# Patient Record
Sex: FEMALE | Race: WHITE | NOT HISPANIC OR LATINO | Employment: FULL TIME | URBAN - METROPOLITAN AREA
[De-identification: names, ages, dates, MRNs, and addresses within clinical notes are randomized per-mention and may not be internally consistent; named-entity substitution may affect disease eponyms.]

---

## 2017-05-16 ENCOUNTER — TELEPHONE (OUTPATIENT)
Dept: FAMILY MEDICINE | Facility: CLINIC | Age: 52
End: 2017-05-16

## 2017-05-16 NOTE — TELEPHONE ENCOUNTER
Patient due for Physical/Blood Pressure Recheck per Becki Dunlap NP.  Called patient, left message to call back. Frances Chaudhari MA

## 2017-08-04 ENCOUNTER — OFFICE VISIT (OUTPATIENT)
Dept: PODIATRY | Facility: CLINIC | Age: 52
End: 2017-08-04
Payer: COMMERCIAL

## 2017-08-04 VITALS — HEIGHT: 63 IN | SYSTOLIC BLOOD PRESSURE: 155 MMHG | DIASTOLIC BLOOD PRESSURE: 95 MMHG

## 2017-08-04 DIAGNOSIS — L60.0 INGROWING NAIL: Primary | ICD-10-CM

## 2017-08-04 PROCEDURE — 11730 AVULSION NAIL PLATE SIMPLE 1: CPT | Mod: T5 | Performed by: PODIATRIST

## 2017-08-04 PROCEDURE — 99203 OFFICE O/P NEW LOW 30 MIN: CPT | Mod: 25 | Performed by: PODIATRIST

## 2017-08-04 NOTE — NURSING NOTE
"Chief Complaint   Patient presents with     Ingrown Toenail     R great toe       Initial BP (!) 155/95 (BP Location: Right arm, Patient Position: Sitting)  Ht 1.6 m (5' 3\") Estimated body mass index is 40.57 kg/(m^2) as calculated from the following:    Height as of 5/2/16: 1.6 m (5' 3\").    Weight as of 12/27/16: 103.9 kg (229 lb).  Medication Reconciliation: complete  "

## 2017-08-04 NOTE — PATIENT INSTRUCTIONS
We wish you continued good healing. If you have any questions or concerns, please do not hesitate to contact us at [phone]      Please remember to call and schedule a follow up appointment if one was recommended at your earliest convenience.   PODIATRY CLINIC HOURS  TELEPHONE NUMBER    Dr. Jaron Resendiz [facility]    Clinics:  [facility]        Kailey Lewis MA  Medical Assistant  Tuesday 1PM-6PM  [facility]  Wednesday 7AM-2PM  [place]  Thursday 10AM-6PM  [place] Friday 7AM-345PM  [place]  Specialty schedulers:   [phone] to make an appointment with any Specialty Provider.        Urgent Care locations:    [facility] Monday-Friday 5 pm - 9 pm. Saturday-Sunday 9 am -5pm    Monday-Friday 11 am - 9 pm Saturday 9 am - 5 pm     Monday-Sunday 12 noon-8PM [phone]     If you need a medication refill, please contact us you may need lab work and/or a follow up visit prior to your refill (i.e. Antifungal medications).    [employer] (secure e-mail communication and access to your chart) to send a message or to make an appointment.    If MRI needed please call Puma Sharma at [phone]        Weight management plan: Patient was referred to their PCP to discuss a diet and exercise plan.

## 2017-08-04 NOTE — MR AVS SNAPSHOT
After Visit Summary   8/4/2017    Seema Babin    MRN: [id number]           Patient Information     Date Of Birth          1965        Visit Information        Provider Department      8/4/2017 2:15 PM Jaron Resendiz DPM [facility]        Care Instructions    We wish you continued good healing. If you have any questions or concerns, please do not hesitate to contact us at [phone]      Please remember to call and schedule a follow up appointment if one was recommended at your earliest convenience.   PODIATRY CLINIC HOURS  TELEPHONE NUMBER    Dr. Jaron ADAMPBETTY FAC FAS    Clinics:  [facility]        Kailey Lewis MA  Medical Assistant  Tuesday 1PM-6PM  [facility]  Wednesday 7AM-2PM  [place]  Thursday 10AM-6PM  [place] Friday 7AM-345PM  [place]  Specialty schedulers:   [phone] to make an appointment with any Specialty Provider.        Urgent Care locations:    [facility] Monday-Friday 5 pm - 9 pm. Saturday-Sunday 9 am -5pm    Monday-Friday 11 am - 9 pm Saturday 9 am - 5 pm     Monday-Sunday 12 noon-8PM [phone]     If you need a medication refill, please contact us you may need lab work and/or a follow up visit prior to your refill (i.e. Antifungal medications).    [employer] (secure e-mail communication and access to your chart) to send a message or to make an appointment.    If MRI needed please call Puma Sharma at [phone]        Weight management plan: Patient was referred to their PCP to discuss a diet and exercise plan.            Follow-ups after your visit        Your next 10 appointments already scheduled     Aug 04, 2017  2:15 PM CDT   New Visit with Jaron Resendiz DPM   [facility] ([facility])    [address] [place] [address]  "[facility] [id number] [phone]              Who to contact     If you have questions or need follow up information about today's clinic visit or your schedule please contact [facility] directly at [phone].  Normal or non-critical lab and imaging results will be communicated to you by MyChart, letter or phone within 4 business days after the clinic has received the results. If you do not hear from us within 7 days, please contact the clinic through MyChart or phone. If you have a critical or abnormal lab result, we will notify you by phone as soon as possible.  Submit refill requests through [employer] or call your pharmacy and they will forward the refill request to us. Please allow 3 business days for your refill to be completed.          Additional Information About Your Visit        [email or web address] Information     [employer] lets you send messages to your doctor, view your test results, renew your prescriptions, schedule appointments and more. To sign up, go to [email or web address] . Click on \"Log in\" on the left side of the screen, which will take you to the Welcome page. Then click on \"Sign up Now\" on the right side of the page.     You will be asked to enter the access code listed below, as well as some personal information. Please follow the directions to create your username and password.     Your access code is: [email or web address]  Expires: 2017 10:18 AM     Your access code will  in 90 days. If you need help or a new code, please call your [facility] or [phone].        Care EveryWhere ID     This is your Care EveryWhere ID. This could be used by other organizations to access your [place] medical records  [id number]        Your Vitals Were     Height                   1.6 m (5' 3\")            Blood Pressure from Last 3 Encounters:   17 (!) 155/95   16 148/86   16 146/90    Weight from Last 3 Encounters:   16 103.9 kg (229 lb)   16 106.1 " kg (234 lb)   04/01/16 106.6 kg (235 lb)              Today, you had the following     No orders found for display       Primary Care Provider Office Phone # Fax #    ANA Camara JESSE [phone]       [place] [id number] [facility] [address]        Equal Access to Services     [facility] : Hadii aad ku hadasho Soomaali, waaxda luqadaha, qaybta kaalmada adeegyada, waxay idiin hayaan adeeg kharash la'aan ah. So [facility] [phone].    ATENCIÓN: Si habla español, tiene a becerril disposición servicios gratuitos de asistencia lingüística. Jessicashaun al [phone].    We comply with applicable federal civil rights laws and [place] laws. We do not discriminate on the basis of race, color, national origin, age, disability sex, sexual orientation or gender identity.            Thank you!     Thank you for choosing [facility]  for your care. Our goal is always to provide you with excellent care. Hearing back from our patients is one way we can continue to improve our services. Please take a few minutes to complete the written survey that you may receive in the mail after your visit with us. Thank you!             Your Updated Medication List - Protect others around you: Learn how to safely use, store and throw away your medicines at www.disposemymeds.org.          This list is accurate as of: 8/4/17  2:13 PM.  Always use your most recent med list.                   Brand Name Dispense Instructions for use Diagnosis    cetirizine 10 MG tablet    zyrTEC    30 tablet    Take 1 tablet (10 mg) by mouth daily

## 2017-08-04 NOTE — PROGRESS NOTES
"Subjective:    Pt is seen today as a new pt referral w/ the c/c of a painful ingrown right great nail medial border.  This has been problematic for 3 month(s).  positivehistory of drainage from the site. This is slowly getting worse.  Aggravated by activity and relieved by rest.  Has tried soaking which has not helped.   denies history of trauma to the area.    ROS:  Denies fever and chill, denies numbness and tingling, denies erythema on dorsum of foot.    Past Medical History:   Diagnosis Date     Hypertension goal BP (blood pressure) < 140/90        Past Surgical History:   Procedure Laterality Date      SECTION       HC TOOTH EXTRACTION W/FORCEP       TUBAL LIGATION         Family History   Problem Relation Age of Onset     DIABETES Mother      DIABETES Sister      DIABETES Brother      DIABETES Maternal Grandmother      Breast Cancer No family hx of      Colon Cancer No family hx of      Prostate Cancer No family hx of        Social History   Substance Use Topics     Smoking status: Never Smoker     Smokeless tobacco: Never Used     Alcohol use No         Current Outpatient Prescriptions:      cetirizine (ZYRTEC) 10 MG tablet, Take 1 tablet (10 mg) by mouth daily, Disp: 30 tablet, Rfl:        Allergies   Allergen Reactions     Sulfa Drugs      Vicodin [Hydrocodone-Acetaminophen]        BP (!) 155/95 (BP Location: Right arm, Patient Position: Sitting)  Ht 1.6 m (5' 3\").  A&O X 3.  Good historian.  Pulses DP, PT 2/4 b/l.  CRT < 3 seconds X 10 digits.  No edema or varicosities noted.  Sensation to light touch intact b/l.  Reflexes 2/4 b/l.  Skin has normal texture and turgor b/l.  Normal arch with weightbearing.  No forefoot or rear foot deformities noted.  MS 5/5 all compartments.  Normal ROM all fore foot and rearfoot joints.  No equinus.  right great toe nail medial border shows soft tissue impingement with localized erythema.   negative active drainage/purulence at this time.  No sinus " tracts.  No nailbed masses or exostosis.  No pain with range of motion of IPJ or MTPJ.  No ascending cellulitis.    ASSESSMENT:    Onychocryptosis with paronychia right toe.    Discussed etiology and treatment options in detail w/ the pt.  The potential causes and nature of an ingrown toenail were discussed with the patient.  We reviewed the natural history/prognosis of the condition and potential risks if no treatment is provided.      Treatment options discussed included conservative management (oral antibiotics, soaking of foot, adequate width shoes)  as well as surgical management (partial or total nail removal).  The pros and cons of both forms of treatment were reviewed.  Handout given to patient.      After thorough discussion and answering all questions, the patient elected to have nail avulsion.  Obtained consent, used 3cc of 1% lidocaine plain to block right first toe.  Sterile prep, then avulsed the affected border(s).  No evidence of deep abscess noted.  Pt tolerated procedure well.  Sterile bandage placed, gave wound care instruction.  Return to clinic in 5 months for permanent .    Jaron Resendiz DPM, FACFAS

## 2017-08-04 NOTE — LETTER
"    2017         RE: Seema Babin  [address] [facility] [address] [address]        Dear Colleague,    Thank you for referring your patient, Seema Babin, to the [facility]. Please see a copy of my visit note below.    Subjective:    Pt is seen today as a new pt referral w/ the c/c of a painful ingrown right great nail medial border.  This has been problematic for 3 month(s).  positivehistory of drainage from the site. This is slowly getting worse.  Aggravated by activity and relieved by rest.  Has tried soaking which has not helped.   denies history of trauma to the area.    ROS:  Denies fever and chill, denies numbness and tingling, denies erythema on dorsum of foot.    Past Medical History:   Diagnosis Date     Hypertension goal BP (blood pressure) < 140/90        Past Surgical History:   Procedure Laterality Date      SECTION       HC TOOTH EXTRACTION W/FORCEP       TUBAL LIGATION         Family History   Problem Relation Age of Onset     DIABETES Mother      DIABETES Sister      DIABETES Brother      DIABETES Maternal Grandmother      Breast Cancer No family hx of      Colon Cancer No family hx of      Prostate Cancer No family hx of        Social History   Substance Use Topics     Smoking status: Never Smoker     Smokeless tobacco: Never Used     Alcohol use No         Current Outpatient Prescriptions:      cetirizine (ZYRTEC) 10 MG tablet, Take 1 tablet (10 mg) by mouth daily, Disp: 30 tablet, Rfl:        Allergies   Allergen Reactions     Sulfa Drugs      Vicodin [Hydrocodone-Acetaminophen]        BP (!) 155/95 (BP Location: Right arm, Patient Position: Sitting)  Ht 1.6 m (5' 3\").  A&O X 3.  Good historian.  Pulses DP, PT 2/4 b/l.  CRT < 3 seconds X 10 digits.  No edema or varicosities noted.  Sensation to light touch intact b/l.  Reflexes 2/4 b/l.  Skin has normal texture and turgor b/l.  Normal arch with weightbearing.  No forefoot or rear foot " deformities noted.  MS 5/5 all compartments.  Normal ROM all fore foot and rearfoot joints.  No equinus.  right great toe nail medial border shows soft tissue impingement with localized erythema.   negative active drainage/purulence at this time.  No sinus tracts.  No nailbed masses or exostosis.  No pain with range of motion of IPJ or MTPJ.  No ascending cellulitis.    ASSESSMENT:    Onychocryptosis with paronychia right toe.    Discussed etiology and treatment options in detail w/ the pt.  The potential causes and nature of an ingrown toenail were discussed with the patient.  We reviewed the natural history/prognosis of the condition and potential risks if no treatment is provided.      Treatment options discussed included conservative management (oral antibiotics, soaking of foot, adequate width shoes)  as well as surgical management (partial or total nail removal).  The pros and cons of both forms of treatment were reviewed.  Handout given to patient.      After thorough discussion and answering all questions, the patient elected to have nail avulsion.  Obtained consent, used 3cc of 1% lidocaine plain to block right first toe.  Sterile prep, then avulsed the affected border(s).  No evidence of deep abscess noted.  Pt tolerated procedure well.  Sterile bandage placed, gave wound care instruction.  Return to clinic in 5 months for permanent .    Jaron Resendiz DPM, FACFAS      Again, thank you for allowing me to participate in the care of your patient.        Sincerely,        Jaron Resendiz DPM

## 2018-10-30 ENCOUNTER — OFFICE VISIT (OUTPATIENT)
Dept: FAMILY MEDICINE | Facility: CLINIC | Age: 53
End: 2018-10-30
Payer: COMMERCIAL

## 2018-10-30 ENCOUNTER — RADIANT APPOINTMENT (OUTPATIENT)
Dept: GENERAL RADIOLOGY | Facility: CLINIC | Age: 53
End: 2018-10-30
Attending: PHYSICIAN ASSISTANT
Payer: COMMERCIAL

## 2018-10-30 VITALS
DIASTOLIC BLOOD PRESSURE: 91 MMHG | HEIGHT: 63 IN | OXYGEN SATURATION: 94 % | TEMPERATURE: 98 F | SYSTOLIC BLOOD PRESSURE: 137 MMHG | WEIGHT: 258.8 LBS | BODY MASS INDEX: 45.86 KG/M2 | HEART RATE: 81 BPM

## 2018-10-30 DIAGNOSIS — M25.562 ACUTE PAIN OF LEFT KNEE: ICD-10-CM

## 2018-10-30 DIAGNOSIS — E66.01 MORBID OBESITY (H): ICD-10-CM

## 2018-10-30 DIAGNOSIS — I10 HYPERTENSION GOAL BP (BLOOD PRESSURE) < 140/90: ICD-10-CM

## 2018-10-30 DIAGNOSIS — M17.11 PRIMARY OSTEOARTHRITIS OF RIGHT KNEE: Primary | ICD-10-CM

## 2018-10-30 DIAGNOSIS — M17.11 PRIMARY OSTEOARTHRITIS OF RIGHT KNEE: ICD-10-CM

## 2018-10-30 PROCEDURE — 73560 X-RAY EXAM OF KNEE 1 OR 2: CPT | Mod: LT

## 2018-10-30 PROCEDURE — 99214 OFFICE O/P EST MOD 30 MIN: CPT | Performed by: PHYSICIAN ASSISTANT

## 2018-10-30 RX ORDER — NAPROXEN 500 MG/1
500 TABLET ORAL 2 TIMES DAILY PRN
Qty: 60 TABLET | Refills: 0 | Status: SHIPPED | OUTPATIENT
Start: 2018-10-30 | End: 2018-11-25

## 2018-10-30 NOTE — PROGRESS NOTES
SUBJECTIVE:   Seema Babin is a 53 year old female who presents to clinic today for the following health issues:      Joint Pain    Onset: ongoing for about a year now for right knee and left knee started recently    Description:   Location: both but left is worse  Character: feels as if you have to crack knuckles but unable to    Intensity: severe    Progression of Symptoms: same but getting worse    Accompanying Signs & Symptoms:  Other symptoms: numbness, tingling when bending down or bending legs and weakness    History:   Previous similar pain: no       Precipitating factors:   Trauma or overuse: no     Alleviating factors:  Improved by: nothing    Therapies Tried and outcome: Ibuprofen but does not help- 800mg not helping. No ice or heat.     Right knee pain. This has been ongoing long term. Has tried ibuprofen but not helpful.   Left knee started about a week ago. So bad she has trouble walking on it. It feels like the knee has to pop. Hard to bend because of a numb feeling. No activities or known injuries. No bruising. No old injuries.   Has a desk job.     Patient is new patient to me. Has not been seen in clinic in several years.     Problem list and histories reviewed & adjusted, as indicated.  Additional history: as documented    Patient Active Problem List   Diagnosis     Body mass index (BMI) of 40.0-44.9 in adult (H)     Hypertension goal BP (blood pressure) < 140/90     Past Surgical History:   Procedure Laterality Date      SECTION        TOOTH EXTRACTION W/FORCEP       TUBAL LIGATION         Social History   Substance Use Topics     Smoking status: Never Smoker     Smokeless tobacco: Never Used     Alcohol use Yes      Comment: wine now and then     Family History   Problem Relation Age of Onset     Diabetes Mother      Diabetes Sister      Diabetes Brother      Diabetes Maternal Grandmother      Breast Cancer No family hx of      Colon Cancer No family hx of       "Prostate Cancer No family hx of            Reviewed and updated as needed this visit by clinical staff  Tobacco  Allergies  Meds  Med Hx  Surg Hx  Fam Hx  Soc Hx      Reviewed and updated as needed this visit by Provider         ROS:  Constitutional, HEENT, cardiovascular, pulmonary, gi and gu systems are negative, except as otherwise noted.    OBJECTIVE:     BP (!) 137/91 (BP Location: Other (Comment), Patient Position: Chair, Cuff Size: Adult Regular)  Pulse 81  Temp 98  F (36.7  C) (Oral)  Ht 5' 2.6\" (1.59 m)  Wt 258 lb 12.8 oz (117.4 kg)  SpO2 94%  BMI 46.44 kg/m2  Body mass index is 46.44 kg/(m^2).  GENERAL: obese, alert and no distress  MS: no gross musculoskeletal defects noted, - No pain with palpation of either knee. No internal derangement of the right knee. Pain and clicking noted with ursula's on the left knee. Negative valgus/varus on the left and negative lachman's.   No bruising or swelling seen at the knee. Patient with 1+ tibial edema bilaterally.   SKIN: no suspicious lesions or rashes  NEURO: Normal strength and tone, mentation intact and speech normal    Diagnostic Test Results:  none     ASSESSMENT/PLAN:       ICD-10-CM    1. Primary osteoarthritis of right knee M17.11 XR Knee Bilateral 1/2 Views     naproxen (NAPROSYN) 500 MG tablet     [facility]   2. Acute pain of left knee M25.562 XR Knee Bilateral 1/2 Views     naproxen (NAPROSYN) 500 MG tablet     [facility]   3. Morbid obesity (H) E66.01    4. Hypertension goal BP (blood pressure) < 140/90 I10    Patient should use tylenol and naproxen as needed for pain. Start physical therapy. Await x-ray results. Encouraged patient to lose weight which will help knee pain.   Advised patient she needs a follow up to discuss her chronic health conditions and elevated blood pressure. This might also be causing the tibial edema.     FUTURE APPOINTMENTS:       - Follow-up visit in 2 " weeks physical.     MELIVNA SingerC  [facility]

## 2018-10-30 NOTE — MR AVS SNAPSHOT
After Visit Summary   10/30/2018    Seema Babin    MRN: [id number]           Patient Information     Date Of Birth          1965        Visit Information        Provider Department      10/30/2018 9:20 AM Jasmyn Dueñas PA-C [facility]        Today's Diagnoses     Primary osteoarthritis of right knee    -  1    Acute pain of left knee          Care Instructions    PLEASE SCHEDULE YOUR PHYSICAL EXAM          Follow-ups after your visit        Additional Services     [facility], AND CHIR[facility]RACTIC REFERRAL       Physical Therapy, Hand Therapy and Chiropractic Care are available through:  *[facility]  *Hand Therapy (Occupational Therapy or Physical Therapy)  *[facility]    Call one number to schedule at any of the above locations: [phone].    Physical therapy, Hand therapy and/or Chiropractic care has been recommended by your physician as an excellent treatment option to reduce pain and help people return to normal activities, including sports.  Therapy and/or chiropractic care services are a great complement or alternative to expensive and invasive surgery, injections, or long-term use of prescription medications. The primary goal is to identify the underlying problem and provide you the tools to manage your condition on your own.     Please be aware that coverage of these services is subject to the terms and limitations of your health insurance plan.  Call member services at your health plan with any benefit or coverage questions.      Please bring the following to your appointment:  *Your personal calendar for scheduling future appointments  *Comfortable clothing                  Follow-up notes from your care team     Return in about 2 weeks (around 11/13/2018) for Physical Exam.      Future tests that were ordered for you today     Open Future Orders        Priority Expected Expires Ordered    XR Knee Bilateral  "1/2 Views Routine 10/30/2018 10/30/2019 10/30/2018    [facility], AND CH[facility]RACTIC REFERRAL Routine  10/30/2019 10/30/2018            Who to contact     If you have questions or need follow up information about today's clinic visit or your schedule please contact [facility] directly at [phone].  Normal or non-critical lab and imaging results will be communicated to you by [employer], letter or phone within 4 business days after the clinic has received the results. If you do not hear from us within 7 days, please contact the clinic through [employer] or phone. If you have a critical or abnormal lab result, we will notify you by phone as soon as possible.  Submit refill requests through [employer] or call your pharmacy and they will forward the refill request to us. Please allow 3 business days for your refill to be completed.          Additional Information About Your Visit        [employer]oh[employer]eran Information     [employer] lets you send messages to your doctor, view your test results, renew your prescriptions, schedule appointments and more. To sign up, go to [email or web address] . Click on \"Log in\" on the left side of the screen, which will take you to the Welcome page. Then click on \"Sign up Now\" on the right side of the page.     You will be asked to enter the access code listed below, as well as some personal information. Please follow the directions to create your username and password.     Your access code is: [email or web address]  Expires: 2019  9:50 AM     Your access code will  in 90 days. If you need help or a new code, please call your [facility] clinic or [phone].        Care EveryWhere ID     This is your Care EveryWhere ID. This could be used by other organizations to access your [facility] medical records  [id number]        Your Vitals Were     Pulse Temperature Height Pulse Oximetry BMI (Body Mass Index)       81 98  F (36.7  C) (Oral) 5' 2.6\" (1.59 m) 94% 46.44 kg/m2        Blood " Pressure from Last 3 Encounters:   10/30/18 (!) 137/91   08/04/17 (!) 155/95   12/27/16 148/86    Weight from Last 3 Encounters:   10/30/18 258 lb 12.8 oz (117.4 kg)   12/27/16 229 lb (103.9 kg)   05/02/16 234 lb (106.1 kg)                 Today's Medication Changes          These changes are accurate as of 10/30/18  9:50 AM.  If you have any questions, ask your nurse or doctor.               Start taking these medicines.        Dose/Directions    naproxen 500 MG tablet   Commonly known as:  NAPROSYN   Used for:  Primary osteoarthritis of right knee, Acute pain of left knee   Started by:  Jasmyn Dueñas PA-C        Dose:  500 mg   Take 1 tablet (500 mg) by mouth 2 times daily as needed for moderate pain   Quantity:  60 tablet   Refills:  0            Where to get your medicines      These medications were sent to [place] [id number] [place] [facility] - [place], [place] - [address]  [address] [place] [address]     Phone:  [phone]     naproxen 500 MG tablet                Primary Care Provider Office Phone # Fax #    Becki Gloria Dunlap, APRN [phone]       [address]  [facility] [address]        Equal Access to Services     [facility] Center[facility]: Hadii aad ku hadasho Soomaali, waaxda luqadaha, qaybta kaalmada adeegyada, waxay idiin hayaan adesallie khemanuel bnuch . So [facility] [phone].    ATENCIÓN: Si habla español, tiene a becerril disposición servicios gratuitos de asistencia lingüística. Llame al [phone].    We comply with applicable federal civil rights laws and [place] laws. We do not discriminate on the basis of race, color, national origin, age, disability, sex, sexual orientation, or gender identity.            Thank you!     Thank you for choosing [facility]  for your care. Our goal is always to provide you with excellent care. Hearing back from our patients is one way we can continue to improve our services. Please take a few minutes to complete the written survey  that you may receive in the mail after your visit with us. Thank you!             Your Updated Medication List - Protect others around you: Learn how to safely use, store and throw away your medicines at www.disposemymeds.org.          This list is accurate as of 10/30/18  9:50 AM.  Always use your most recent med list.                   Brand Name Dispense Instructions for use Diagnosis    cetirizine 10 MG tablet    zyrTEC    30 tablet    Take 1 tablet (10 mg) by mouth daily        IBUPROFEN PO      Take by mouth as needed for moderate pain        naproxen 500 MG tablet    NAPROSYN    60 tablet    Take 1 tablet (500 mg) by mouth 2 times daily as needed for moderate pain    Primary osteoarthritis of right knee, Acute pain of left knee

## 2018-10-30 NOTE — LETTER
[facility]   [address] [place]  [address]  [phone]                                   October 30, 2018    Seema Babin  [address] [facility] [facility] [place] [facility] [place] [address]        Dear Seema,    As suspected you have arthritis of the right knee. The left knee did not show an acute/new problems. Please follow up with physical therapy as planned.     Results for orders placed or performed in visit on 10/30/18   XR Knee Bilateral 1/2 Views    Narrative    XR KNEE BILATERAL 1/2 VW 10/30/2018 10:07 AM    COMPARISON: None.    HISTORY: Bilateral knee osteoarthritis.    FINDINGS:  RIGHT knee: Tricompartment osteophytosis with mild medial  compartmental joint space loss. No fractures are seen. Small amount of  fluid in the joint.    LEFT knee: No fractures are seen. Joint spaces are preserved. No knee  effusion.      Impression    IMPRESSION: Mild medial compartment predominant RIGHT knee  osteoarthritis. No acute bone abnormality identified in either knee.    ROSY ABDI MD       If you have any questions please call the clinic at [phone]    Sincerely,    Jasmyn Dueñas PA-C  hnr

## 2018-10-30 NOTE — PROGRESS NOTES
As suspected you have arthritis of the right knee. The left knee did not show an acute/new problems. Please follow up with physical therapy as planned.   Jasmyn Dueñas PA-C

## 2018-11-25 ENCOUNTER — TELEPHONE (OUTPATIENT)
Dept: FAMILY MEDICINE | Facility: CLINIC | Age: 53
End: 2018-11-25

## 2018-11-25 DIAGNOSIS — M17.11 PRIMARY OSTEOARTHRITIS OF RIGHT KNEE: ICD-10-CM

## 2018-11-25 DIAGNOSIS — M25.562 ACUTE PAIN OF LEFT KNEE: ICD-10-CM

## 2018-11-26 RX ORDER — NAPROXEN 500 MG/1
TABLET ORAL
Qty: 60 TABLET | Refills: 0 | Status: SHIPPED | OUTPATIENT
Start: 2018-11-26 | End: 2018-11-30

## 2018-11-26 NOTE — TELEPHONE ENCOUNTER
Routing refill request to provider for review/approval because:  Failed labs and BP.Susan Gilbert, [facility] Triage.

## 2018-11-26 NOTE — TELEPHONE ENCOUNTER
"Requested Prescriptions   Pending Prescriptions Disp Refills     naproxen (NAPROSYN) 500 MG tablet [Pharmacy Med Name: NAPROXEN 500 MG TABLET] 60 tablet 0    Last Written Prescription Date:  10/30/18  Last Fill Quantity: 60,  # refills: 0   Last office visit: 10/30/2018 with prescribing provider:     Future Office Visit:     Sig: TAKE 1 TABLET (500 MG) BY MOUTH 2 TIMES DAILY AS NEEDED FOR MODERATE PAIN    NSAID Medications Failed    11/25/2018 12:03 PM       Failed - Blood pressure under 140/90 in past 12 months    BP Readings from Last 3 Encounters:   10/30/18 (!) 137/91   08/04/17 (!) 155/95   12/27/16 148/86                Failed - Normal ALT on file in past 12 months    No lab results found.         Failed - Normal AST on file in past 12 months    No lab results found.         Failed - Normal CBC on file in past 12 months    No lab results found.             Failed - Normal serum creatinine on file in past 12 months    No lab results found.         Passed - Recent (12 mo) or future (30 days) visit within the authorizing provider's specialty    Patient had office visit in the last 12 months or has a visit in the next 30 days with authorizing provider or within the authorizing provider's specialty.  See \"Patient Info\" tab in inbasket, or \"Choose Columns\" in Meds & Orders section of the refill encounter.             Passed - Patient is age 6-64 years       Passed - No active pregnancy on record       Passed - No positive pregnancy test in past 12 months          "

## 2018-11-26 NOTE — TELEPHONE ENCOUNTER
1 refill. Patient needs to schedule a physical. She should also schedule with physical therapy if she is continuing to need the medications.   Jasmyn Dueñas PA-C

## 2018-11-28 NOTE — PATIENT INSTRUCTIONS
Look at [employer]i's shoes    Look at MyFitnessPal and [employer]  Start with a goal of losing 1 lb/week    Preventive Health Recommendations  Female Ages 50 - 64    Yearly exam: See your health care provider every year in order to  o Review health changes.   o Discuss preventive care.    o Review your medicines if your doctor has prescribed any.      Get a Pap test every three years (unless you have an abnormal result and your provider advises testing more often).    If you get Pap tests with HPV test, you only need to test every 5 years, unless you have an abnormal result.     You do not need a Pap test if your uterus was removed (hysterectomy) and you have not had cancer.    You should be tested each year for STDs (sexually transmitted diseases) if you're at risk.     Have a mammogram every 1 to 2 years.    Have a colonoscopy at age 50, or have a yearly FIT test (stool test). These exams screen for colon cancer.      Have a cholesterol test every 5 years, or more often if advised.    Have a diabetes test (fasting glucose) every three years. If you are at risk for diabetes, you should have this test more often.     If you are at risk for osteoporosis (brittle bone disease), think about having a bone density scan (DEXA).    Shots: Get a flu shot each year. Get a tetanus shot every 10 years.    Nutrition:     Eat at least 5 servings of fruits and vegetables each day.    Eat whole-grain bread, whole-wheat pasta and brown rice instead of white grains and rice.    Get adequate Calcium and Vitamin D.     Lifestyle    Exercise at least 150 minutes a week (30 minutes a day, 5 days a week). This will help you control your weight and prevent disease.    Limit alcohol to one drink per day.    No smoking.     Wear sunscreen to prevent skin cancer.     See your dentist every six months for an exam and cleaning.    See your eye doctor every 1 to 2 years.  [facility]    If you have any questions regarding to your  visit please contact your care team:       Team Red:   Clinic Hours Telephone Number   Dr. Manda Dunlap, NP 7am-7pm  Monday - Thursday   7am-5pm  Fridays  [phone]  (Appointment scheduling available 24/7)   Urgent Care - [place] - 11am-9pm Monday-Friday Saturday-Sunday- 9am-5pm   [place] - 5pm-9pm Monday-Friday Saturday-Sunday- 9am-5pm  [phone] - [place]  [phone] - [place]       What options do I have for a visit other than an office visit? We offer electronic visits (e-visits) and telephone visits, when medically appropriate.  Please check with your medical insurance to see if these types of visits are covered, as you will be responsible for any charges that are not paid by your insurance.      You can use Canonical (secure electronic communication) to access to your chart, send your primary care provider a message, or make an appointment. Ask a team member how to get started.     For a price quote for your services, please call our Consumer Price Line at [phone] or our Imaging Cost estimation line at [phone] (for imaging tests).

## 2018-11-30 ENCOUNTER — OFFICE VISIT (OUTPATIENT)
Dept: FAMILY MEDICINE | Facility: CLINIC | Age: 53
End: 2018-11-30
Payer: COMMERCIAL

## 2018-11-30 VITALS
HEART RATE: 84 BPM | WEIGHT: 267 LBS | OXYGEN SATURATION: 95 % | DIASTOLIC BLOOD PRESSURE: 92 MMHG | HEIGHT: 63 IN | TEMPERATURE: 98.4 F | SYSTOLIC BLOOD PRESSURE: 146 MMHG | BODY MASS INDEX: 47.31 KG/M2

## 2018-11-30 DIAGNOSIS — L98.9 CHANGING SKIN LESION: ICD-10-CM

## 2018-11-30 DIAGNOSIS — Z12.11 SCREEN FOR COLON CANCER: ICD-10-CM

## 2018-11-30 DIAGNOSIS — M76.62 ACHILLES TENDINITIS, LEFT LEG: ICD-10-CM

## 2018-11-30 DIAGNOSIS — E66.01 MORBID OBESITY (H): ICD-10-CM

## 2018-11-30 DIAGNOSIS — Z12.31 VISIT FOR SCREENING MAMMOGRAM: ICD-10-CM

## 2018-11-30 DIAGNOSIS — E03.9 HYPOTHYROIDISM, UNSPECIFIED TYPE: ICD-10-CM

## 2018-11-30 DIAGNOSIS — L91.8 SKIN TAG: ICD-10-CM

## 2018-11-30 DIAGNOSIS — Z23 NEED FOR VACCINATION: ICD-10-CM

## 2018-11-30 DIAGNOSIS — Z00.00 ROUTINE HISTORY AND PHYSICAL EXAMINATION OF ADULT: Primary | ICD-10-CM

## 2018-11-30 DIAGNOSIS — Z13.6 CARDIOVASCULAR SCREENING; LDL GOAL LESS THAN 160: ICD-10-CM

## 2018-11-30 DIAGNOSIS — M17.10 ARTHRITIS OF KNEE: ICD-10-CM

## 2018-11-30 DIAGNOSIS — R60.0 PERIPHERAL EDEMA: ICD-10-CM

## 2018-11-30 DIAGNOSIS — I10 HYPERTENSION GOAL BP (BLOOD PRESSURE) < 140/90: ICD-10-CM

## 2018-11-30 LAB
ANION GAP SERPL CALCULATED.3IONS-SCNC: 5 MMOL/L (ref 3–14)
BASOPHILS # BLD AUTO: 0 10E9/L (ref 0–0.2)
BASOPHILS NFR BLD AUTO: 0.4 %
BUN SERPL-MCNC: 14 MG/DL (ref 7–30)
CALCIUM SERPL-MCNC: 8.7 MG/DL (ref 8.5–10.1)
CHLORIDE SERPL-SCNC: 103 MMOL/L (ref 94–109)
CHOLEST SERPL-MCNC: 180 MG/DL
CO2 SERPL-SCNC: 28 MMOL/L (ref 20–32)
CREAT SERPL-MCNC: 0.83 MG/DL (ref 0.52–1.04)
DIFFERENTIAL METHOD BLD: NORMAL
EOSINOPHIL # BLD AUTO: 0.1 10E9/L (ref 0–0.7)
EOSINOPHIL NFR BLD AUTO: 1.8 %
ERYTHROCYTE [DISTWIDTH] IN BLOOD BY AUTOMATED COUNT: 13.7 % (ref 10–15)
GFR SERPL CREATININE-BSD FRML MDRD: 72 ML/MIN/1.7M2
GLUCOSE SERPL-MCNC: 89 MG/DL (ref 70–99)
HBA1C MFR BLD: 5.7 % (ref 0–5.6)
HCT VFR BLD AUTO: 42.8 % (ref 35–47)
HDLC SERPL-MCNC: 40 MG/DL
HGB BLD-MCNC: 14.2 G/DL (ref 11.7–15.7)
LDLC SERPL CALC-MCNC: 114 MG/DL
LYMPHOCYTES # BLD AUTO: 2.5 10E9/L (ref 0.8–5.3)
LYMPHOCYTES NFR BLD AUTO: 32.3 %
MCH RBC QN AUTO: 32.5 PG (ref 26.5–33)
MCHC RBC AUTO-ENTMCNC: 33.2 G/DL (ref 31.5–36.5)
MCV RBC AUTO: 98 FL (ref 78–100)
MONOCYTES # BLD AUTO: 0.8 10E9/L (ref 0–1.3)
MONOCYTES NFR BLD AUTO: 10.3 %
NEUTROPHILS # BLD AUTO: 4.3 10E9/L (ref 1.6–8.3)
NEUTROPHILS NFR BLD AUTO: 55.2 %
NONHDLC SERPL-MCNC: 140 MG/DL
PLATELET # BLD AUTO: 229 10E9/L (ref 150–450)
POTASSIUM SERPL-SCNC: 3.9 MMOL/L (ref 3.4–5.3)
RBC # BLD AUTO: 4.37 10E12/L (ref 3.8–5.2)
SODIUM SERPL-SCNC: 136 MMOL/L (ref 133–144)
T4 FREE SERPL-MCNC: 1.11 NG/DL (ref 0.76–1.46)
TRIGL SERPL-MCNC: 130 MG/DL
TSH SERPL DL<=0.005 MIU/L-ACNC: 13.4 MU/L (ref 0.4–4)
WBC # BLD AUTO: 7.7 10E9/L (ref 4–11)

## 2018-11-30 PROCEDURE — 80061 LIPID PANEL: CPT | Performed by: NURSE PRACTITIONER

## 2018-11-30 PROCEDURE — 93000 ELECTROCARDIOGRAM COMPLETE: CPT | Performed by: NURSE PRACTITIONER

## 2018-11-30 PROCEDURE — 36415 COLL VENOUS BLD VENIPUNCTURE: CPT | Performed by: NURSE PRACTITIONER

## 2018-11-30 PROCEDURE — 85025 COMPLETE CBC W/AUTO DIFF WBC: CPT | Performed by: NURSE PRACTITIONER

## 2018-11-30 PROCEDURE — 83036 HEMOGLOBIN GLYCOSYLATED A1C: CPT | Performed by: NURSE PRACTITIONER

## 2018-11-30 PROCEDURE — 90471 IMMUNIZATION ADMIN: CPT | Performed by: NURSE PRACTITIONER

## 2018-11-30 PROCEDURE — 84439 ASSAY OF FREE THYROXINE: CPT | Performed by: NURSE PRACTITIONER

## 2018-11-30 PROCEDURE — 80048 BASIC METABOLIC PNL TOTAL CA: CPT | Performed by: NURSE PRACTITIONER

## 2018-11-30 PROCEDURE — 99214 OFFICE O/P EST MOD 30 MIN: CPT | Mod: 25 | Performed by: NURSE PRACTITIONER

## 2018-11-30 PROCEDURE — 84443 ASSAY THYROID STIM HORMONE: CPT | Performed by: NURSE PRACTITIONER

## 2018-11-30 PROCEDURE — 90715 TDAP VACCINE 7 YRS/> IM: CPT | Performed by: NURSE PRACTITIONER

## 2018-11-30 PROCEDURE — 99396 PREV VISIT EST AGE 40-64: CPT | Mod: 25 | Performed by: NURSE PRACTITIONER

## 2018-11-30 RX ORDER — CLONIDINE HYDROCHLORIDE 0.1 MG/1
0.1 TABLET ORAL ONCE
Qty: 1 TABLET | Refills: 0
Start: 2018-11-30 | End: 2019-01-18

## 2018-11-30 RX ORDER — LISINOPRIL/HYDROCHLOROTHIAZIDE 10-12.5 MG
1 TABLET ORAL DAILY
Qty: 30 TABLET | Refills: 0 | Status: CANCELLED | OUTPATIENT
Start: 2018-11-30

## 2018-11-30 RX ORDER — TRAMADOL HYDROCHLORIDE 50 MG/1
50 TABLET ORAL EVERY 6 HOURS PRN
Qty: 15 TABLET | Refills: 0 | Status: SHIPPED | OUTPATIENT
Start: 2018-11-30 | End: 2019-01-18

## 2018-11-30 RX ORDER — HYDROCHLOROTHIAZIDE 12.5 MG/1
12.5 TABLET ORAL DAILY
Qty: 30 TABLET | Refills: 0 | Status: SHIPPED | OUTPATIENT
Start: 2018-11-30 | End: 2018-12-21

## 2018-11-30 ASSESSMENT — ENCOUNTER SYMPTOMS
ABDOMINAL PAIN: 1
DIARRHEA: 1

## 2018-11-30 ASSESSMENT — PATIENT HEALTH QUESTIONNAIRE - PHQ9
SUM OF ALL RESPONSES TO PHQ QUESTIONS 1-9: 8
SUM OF ALL RESPONSES TO PHQ QUESTIONS 1-9: 8
10. IF YOU CHECKED OFF ANY PROBLEMS, HOW DIFFICULT HAVE THESE PROBLEMS MADE IT FOR YOU TO DO YOUR WORK, TAKE CARE OF THINGS AT HOME, OR GET ALONG WITH OTHER PEOPLE: NOT DIFFICULT AT ALL

## 2018-11-30 ASSESSMENT — PAIN SCALES - GENERAL: PAINLEVEL: WORST PAIN (10)

## 2018-11-30 NOTE — MR AVS SNAPSHOT
After Visit Summary   11/30/2018    Seema Babin    MRN: [id number]           Patient Information     Date Of Birth          1965        Visit Information        Provider Department      11/30/2018 11:40 AM Becki Dunlap APRN [facility] [place]        Today's Diagnoses     Routine history and physical examination of adult    -  1    Hypertension goal BP (blood pressure) < 140/90        Morbid obesity (H)        Screen for colon cancer        Visit for screening mammogram        Need for vaccination        Need for prophylactic vaccination and inoculation against influenza        CARDIOVASCULAR SCREENING; LDL GOAL LESS THAN 160        Arthritis of knee        Achilles tendinitis, left leg        Changing skin lesion        Skin tag        Peripheral edema          Care Instructions    Look at Raffi's shoes    Look at MyFitnessPal and Spark People  Start with a goal of losing 1 lb/week    Preventive Health Recommendations  Female Ages 50 - 64    Yearly exam: See your health care provider every year in order to  o Review health changes.   o Discuss preventive care.    o Review your medicines if your doctor has prescribed any.      Get a Pap test every three years (unless you have an abnormal result and your provider advises testing more often).    If you get Pap tests with HPV test, you only need to test every 5 years, unless you have an abnormal result.     You do not need a Pap test if your uterus was removed (hysterectomy) and you have not had cancer.    You should be tested each year for STDs (sexually transmitted diseases) if you're at risk.     Have a mammogram every 1 to 2 years.    Have a colonoscopy at age 50, or have a yearly FIT test (stool test). These exams screen for colon cancer.      Have a cholesterol test every 5 years, or more often if advised.    Have a diabetes test (fasting glucose) every three years. If you are at risk for diabetes, you should have  this test more often.     If you are at risk for osteoporosis (brittle bone disease), think about having a bone density scan (DEXA).    Shots: Get a flu shot each year. Get a tetanus shot every 10 years.    Nutrition:     Eat at least 5 servings of fruits and vegetables each day.    Eat whole-grain bread, whole-wheat pasta and brown rice instead of white grains and rice.    Get adequate Calcium and Vitamin D.     Lifestyle    Exercise at least 150 minutes a week (30 minutes a day, 5 days a week). This will help you control your weight and prevent disease.    Limit alcohol to one drink per day.    No smoking.     Wear sunscreen to prevent skin cancer.     See your dentist every six months for an exam and cleaning.    See your eye doctor every 1 to 2 years.  [facility]    If you have any questions regarding to your visit please contact your care team:       Team Red:   Clinic Hours Telephone Number   Dr. Manda Dunlap, NP 7am-7pm  Monday - Thursday   7am-5pm  Fridays  [phone]  (Appointment scheduling available 24/7)   Urgent Care - [place] - 11am-9pm Monday-Friday Saturday-Sunday- 9am-5pm   [place] - 5pm-9pm Monday-Friday Saturday-Sunday- 9am-5pm  [phone] - [place]  [phone] - [place]       What options do I have for a visit other than an office visit? We offer electronic visits (e-visits) and telephone visits, when medically appropriate.  Please check with your medical insurance to see if these types of visits are covered, as you will be responsible for any charges that are not paid by your insurance.      You can use [employer] ([employer]) (secure electronic communication) to access to your chart, send your primary care provider a message, or make an appointment. Ask a team member how to get started.     For a price quote for your services, please call our Consumer Price Line at [phone] or our Imaging Cost  estimation line at [phone] (for imaging tests).      Discharged by Frances Chaudhari MA.            Follow-ups after your visit        Additional Services     [facility], AND CHIR[facility]RACTIC REFERRAL       Physical Therapy, Hand Therapy and Chiropractic Care are available through:  *[facility]  *Hand Therapy (Occupational Therapy or Physical Therapy)  *[facility]    Call one number to schedule at any of the above locations: [phone].    Physical therapy, Hand therapy and/or Chiropractic care has been recommended by your physician as an excellent treatment option to reduce pain and help people return to normal activities, including sports.  Therapy and/or chiropractic care services are a great complement or alternative to expensive and invasive surgery, injections, or long-term use of prescription medications. The primary goal is to identify the underlying problem and provide you the tools to manage your condition on your own.     Please be aware that coverage of these services is subject to the terms and limitations of your health insurance plan.  Call member services at your health plan with any benefit or coverage questions.      Please bring the following to your appointment:  *Your personal calendar for scheduling future appointments  *Comfortable clothing                  Follow-up notes from your care team     Return in about 1 month (around 12/30/2018) for BP & shave biopsy- 40 min appt.      Future tests that were ordered for you today     Open Future Orders        Priority Expected Expires Ordered    MA SCREENING DIGITAL BILAT - Future  (s+30) Routine  11/28/2019 11/30/2018    Fecal colorectal cancer screen FIT Routine 12/21/2018 11/30/2019 11/30/2018    [facility], AND CHIR[facility]RACTIC REFERRAL Routine  11/30/2019 11/30/2018            Who to contact     If you have questions or need follow up information about today's clinic visit or your schedule please  "contact [facility] directly at [phone].  Normal or non-critical lab and imaging results will be communicated to you by MyChart, letter or phone within 4 business days after the clinic has received the results. If you do not hear from us within 7 days, please contact the clinic through MyChart or phone. If you have a critical or abnormal lab result, we will notify you by phone as soon as possible.  Submit refill requests through [employer] or call your pharmacy and they will forward the refill request to us. Please allow 3 business days for your refill to be completed.          Additional Information About Your Visit        [employer] Information     Docin lets you send messages to your doctor, view your test results, renew your prescriptions, schedule appointments and more. To sign up, go to [email or web address] . Click on \"Log in\" on the left side of the screen, which will take you to the Welcome page. Then click on \"Sign up Now\" on the right side of the page.     You will be asked to enter the access code listed below, as well as some personal information. Please follow the directions to create your username and password.     Your access code is: [email or web address]  Expires: 2019 11:39 AM     Your access code will  in 90 days. If you need help or a new code, please call your [place] clinic or [phone].        Care EveryWhere ID     This is your Care EveryWhere ID. This could be used by other organizations to access your [place] medical records  [id number]        Your Vitals Were     Pulse Temperature Height Pulse Oximetry BMI (Body Mass Index)       84 98.4  F (36.9  C) (Oral) 5' 2.5\" (1.588 m) 95% 48.06 kg/m2        Blood Pressure from Last 3 Encounters:   18 (!) 170/118   10/30/18 (!) 137/91   17 (!) 155/95    Weight from Last 3 Encounters:   18 267 lb (121.1 kg)   10/30/18 258 lb 12.8 oz (117.4 kg)   16 229 lb (103.9 kg)              We Performed the " Following     Basic metabolic panel     CBC with platelets differential     EKG 12-lead complete w/read - Clinics     Hemoglobin A1c     Lipid panel reflex to direct LDL Non-fasting     TDAP VACCINE (ADACEL) [55994.002]     TSH with free T4 reflex          Today's Medication Changes          These changes are accurate as of 11/30/18  1:06 PM.  If you have any questions, ask your nurse or doctor.               Start taking these medicines.        Dose/Directions    cloNIDine 0.1 MG tablet   Commonly known as:  CATAPRES   Used for:  Hypertension goal BP (blood pressure) < 140/90   Started by:  Becki Dunlap APRN CNP        Dose:  0.1 mg   Take 1 tablet (0.1 mg) by mouth once for 1 dose   Quantity:  1 tablet   Refills:  0       hydrochlorothiazide 12.5 MG tablet   Commonly known as:  HYDRODIURIL   Used for:  Hypertension goal BP (blood pressure) < 140/90   Started by:  Becki Dunlap APRN CNP        Dose:  12.5 mg   Take 1 tablet (12.5 mg) by mouth daily   Quantity:  30 tablet   Refills:  0       order for DME   Used for:  Peripheral edema   Started by:  Becki Dunlap APRN CNP        Equipment being ordered: Class 2 knee high Jobst compression stockings   Quantity:  1 each   Refills:  99       traMADol 50 MG tablet   Commonly known as:  ULTRAM   Used for:  Arthritis of knee   Started by:  Becki Dunlap APRN CNP        Dose:  50 mg   Take 1 tablet (50 mg) by mouth every 6 hours as needed for severe pain   Quantity:  15 tablet   Refills:  0            Where to get your medicines      These medications were sent to [place] [address] [facility]  [address]  [address] [place] [address]     Phone:  [phone]     hydrochlorothiazide 12.5 MG tablet         Some of these will need a paper prescription and others can be bought over the counter.  Ask your nurse if you have questions.     Bring a paper prescription for each of these medications     order for DME     traMADol 50 MG tablet       You don't need a prescription for these medications     cloNIDine 0.1 MG tablet               Information about OPIOIDS     PRESCRIPTION OPIOIDS: WHAT YOU NEED TO KNOW   We gave you an opioid (narcotic) pain medicine. It is important to manage your pain, but opioids are not always the best choice. You should first try all the other options your care team gave you. Take this medicine for as short a time (and as few doses) as possible.    Some activities can increase your pain, such as bandage changes or therapy sessions. It may help to take your pain medicine 30 to 60 minutes before these activities. Reduce your stress by getting enough sleep, working on hobbies you enjoy and practicing relaxation or meditation. Talk to your care team about ways to manage your pain beyond prescription opioids.    These medicines have risks:    DO NOT drive when on new or higher doses of pain medicine. These medicines can affect your alertness and reaction times, and you could be arrested for driving under the influence (DUI). If you need to use opioids long-term, talk to your care team about driving.    DO NOT operate heavy machinery    DO NOT do any other dangerous activities while taking these medicines.    DO NOT drink any alcohol while taking these medicines.     If the opioid prescribed includes acetaminophen, DO NOT take with any other medicines that contain acetaminophen. Read all labels carefully. Look for the word  acetaminophen  or  Tylenol.  Ask your pharmacist if you have questions or are unsure.    You can get addicted to pain medicines, especially if you have a history of addiction (chemical, alcohol or substance dependence). Talk to your care team about ways to reduce this risk.    All opioids tend to cause constipation. Drink plenty of water and eat foods that have a lot of fiber, such as fruits, vegetables, prune juice, apple juice and high-fiber cereal. Take a laxative (Miralax, milk of  magnesia, Colace, Senna) if you don t move your bowels at least every other day. Other side effects include upset stomach, sleepiness, dizziness, throwing up, tolerance (needing more of the medicine to have the same effect), physical dependence and slowed breathing.    Store your pills in a secure place, locked if possible. We will not replace any lost or stolen medicine. If you don t finish your medicine, please throw away (dispose) as directed by your pharmacist. The [place] Pollution Control Agency has more information about safe disposal: [email or web address]         Primary Care Provider Office Phone # Fax #    Becki ANA Baxter [phone]       [address] [facility] [address]        Equal Access to Services     BESSY WOOTEN : Hadii aad ku hadasho Sonevaeh, waaxda luqadaha, qaybta kaalmada adesallieyanoel, alecia bunch . So [facility] [phone].    ATENCIÓN: Si habla español, tiene a becerril disposición servicios gratuitos de asistencia lingüística. Ana Rosa al [phone].    We comply with applicable federal civil rights laws and [place] laws. We do not discriminate on the basis of race, color, national origin, age, disability, sex, sexual orientation, or gender identity.            Thank you!     Thank you for choosing [facility]  for your care. Our goal is always to provide you with excellent care. Hearing back from our patients is one way we can continue to improve our services. Please take a few minutes to complete the written survey that you may receive in the mail after your visit with us. Thank you!             Your Updated Medication List - Protect others around you: Learn how to safely use, store and throw away your medicines at www.disposemymeds.org.          This list is accurate as of 11/30/18  1:06 PM.  Always use your most recent med list.                   Brand Name Dispense  Instructions for use Diagnosis    cetirizine 10 MG tablet    zyrTEC    30 tablet    Take 1 tablet (10 mg) by mouth daily        cloNIDine 0.1 MG tablet    CATAPRES    1 tablet    Take 1 tablet (0.1 mg) by mouth once for 1 dose    Hypertension goal BP (blood pressure) < 140/90       hydrochlorothiazide 12.5 MG tablet    HYDRODIURIL    30 tablet    Take 1 tablet (12.5 mg) by mouth daily    Hypertension goal BP (blood pressure) < 140/90       naproxen 500 MG tablet    NAPROSYN    60 tablet    TAKE 1 TABLET (500 MG) BY MOUTH 2 TIMES DAILY AS NEEDED FOR MODERATE PAIN    Primary osteoarthritis of right knee, Acute pain of left knee       order for DME     1 each    Equipment being ordered: Class 2 knee high Jobst compression stockings    Peripheral edema       traMADol 50 MG tablet    ULTRAM    15 tablet    Take 1 tablet (50 mg) by mouth every 6 hours as needed for severe pain    Arthritis of knee

## 2018-11-30 NOTE — PROGRESS NOTES
SUBJECTIVE:   CC: Seema Babin is an 53 year old woman who presents for preventive health visit.     Physical   Annual:     Getting at least 3 servings of Calcium per day:  NO    Bi-annual eye exam:  NO    Dental care twice a year:  NO    Sleep apnea or symptoms of sleep apnea:  Sleep apnea    Diet:  Regular (no restrictions)    Frequency of exercise:  None    Taking medications regularly:  Yes    Additional concerns today:  YES (lump on left ankle/heel)    PHQ-2 Total Score: 3    -Was seen at another clinic for knee pain recently- Physical Therapy was ordered but didn't start yet. Naproxen too hard on stomach, hasn't tried acetaminophen but usually doesn't work. Pain is severe- wonders what she can take?  -BP high today- feels this is due to pain. No CP/SOB but does have ankle swelling.   -Lump behind left ankle, feels painful posterior ankle. Tried bracing without help.  -Skin tags around eyes. Mole left chest- scratched off recently.    Today's PHQ-2 Score:   PHQ-2 ( 1999 Pfizer) 11/30/2018   Q1: Little interest or pleasure in doing things 3   Q2: Feeling down, depressed or hopeless 0   PHQ-2 Score 3   Q1: Little interest or pleasure in doing things Nearly every day   Q2: Feeling down, depressed or hopeless Not at all   PHQ-2 Score 3       Abuse: Current or Past(Physical, Sexual or Emotional)- No  Do you feel safe in your environment? Yes    Social History   Substance Use Topics     Smoking status: Never Smoker     Smokeless tobacco: Never Used     Alcohol use Yes      Comment: wine now and then     Alcohol Use 11/30/2018   If you drink alcohol do you typically have greater than 3 drinks per day OR greater than 7 drinks per week? No   No flowsheet data found.    Reviewed orders with patient.  Reviewed health maintenance and updated orders accordingly - Yes  Labs reviewed in EPIC  BP Readings from Last 3 Encounters:   11/30/18 (!) 146/92   10/30/18 (!) 137/91   08/04/17 (!) 155/95    Wt Readings from Last  "3 Encounters:   11/30/18 267 lb (121.1 kg)   10/30/18 258 lb 12.8 oz (117.4 kg)   12/27/16 229 lb (103.9 kg)                    Mammogram Screening: Patient over age 50, mutual decision to screen reflected in health maintenance.    Pertinent mammograms are reviewed under the imaging tab.  History of abnormal Pap smear: NO - age 30-65 PAP every 5 years with negative HPV co-testing recommended  PAP / HPV Latest Ref Rng & Units 5/2/2016   PAP - NIL   HPV 16 DNA NEG Negative   HPV 18 DNA NEG Negative   OTHER HR HPV NEG Negative     Reviewed and updated as needed this visit by clinical staff  Tobacco  Allergies  Meds         Reviewed and updated as needed this visit by Provider            Review of Systems   Gastrointestinal: Positive for abdominal pain and diarrhea.     CONSTITUTIONAL: NEGATIVE for fever, chills, change in weight  INTEGUMENTARY/SKIN: as above  EYES: NEGATIVE for vision changes or irritation  ENT: NEGATIVE for ear, mouth and throat problems  RESP: NEGATIVE for significant cough or SOB  BREAST: NEGATIVE for masses, tenderness or discharge  CV: NEGATIVE for chest pain, palpitations or peripheral edema  GI: NEGATIVE for nausea, abdominal pain, heartburn, or change in bowel habits  : NEGATIVE for unusual urinary or vaginal symptoms. No vaginal bleeding.  MUSCULOSKELETAL: as above  NEURO: NEGATIVE for weakness, dizziness or paresthesias  PSYCHIATRIC: NEGATIVE for changes in mood or affect      OBJECTIVE:   BP (!) 170/118 (BP Location: Left arm, Patient Position: Chair, Cuff Size: Adult Large)  Pulse 84  Temp 98.4  F (36.9  C) (Oral)  Ht 5' 2.5\" (1.588 m)  Wt 267 lb (121.1 kg)  SpO2 95%  BMI 48.06 kg/m2  Physical Exam  GENERAL: healthy, alert and no distress  EYES: Eyes grossly normal to inspection, PERRL and conjunctivae and sclerae normal  HENT: ear canals and TM's normal, nose and mouth without ulcers or lesions  NECK: no adenopathy, no asymmetry, masses, or scars and thyroid normal to " palpation  RESP: lungs clear to auscultation - no rales, rhonchi or wheezes  CV: regular rate and rhythm, normal S1 S2, no S3 or S4, no murmur, click or rub, 1+ peripheral edema and peripheral pulses strong  ABDOMEN: soft, nontender, no hepatosplenomegaly, no masses and bowel sounds normal  MS: Left Achilles tendon tender to palpation with bony hypertrophy at insertion  SKIN: Excoriated, irregular 5 mm mole left upper chest. Seborrheic keratoses right distal anterior thigh, left proximal medial calf. Multiple skin tags on/around eyelids.  NEURO: Normal strength and tone, mentation intact and speech normal  PSYCH: mentation appears normal, affect normal/bright    Diagnostic Test Results:  Results for orders placed or performed in visit on 11/30/18 (from the past 24 hour(s))   CBC with platelets differential   Result Value Ref Range    WBC 7.7 4.0 - 11.0 10e9/L    RBC Count 4.37 3.8 - 5.2 10e12/L    Hemoglobin 14.2 11.7 - 15.7 g/dL    Hematocrit 42.8 35.0 - 47.0 %    MCV 98 78 - 100 fl    MCH 32.5 26.5 - 33.0 pg    MCHC 33.2 31.5 - 36.5 g/dL    RDW 13.7 10.0 - 15.0 %    Platelet Count 229 150 - 450 10e9/L    % Neutrophils 55.2 %    % Lymphocytes 32.3 %    % Monocytes 10.3 %    % Eosinophils 1.8 %    % Basophils 0.4 %    Absolute Neutrophil 4.3 1.6 - 8.3 10e9/L    Absolute Lymphocytes 2.5 0.8 - 5.3 10e9/L    Absolute Monocytes 0.8 0.0 - 1.3 10e9/L    Absolute Eosinophils 0.1 0.0 - 0.7 10e9/L    Absolute Basophils 0.0 0.0 - 0.2 10e9/L    Diff Method Automated Method    Hemoglobin A1c   Result Value Ref Range    Hemoglobin A1C 5.7 (H) 0 - 5.6 %       ASSESSMENT/PLAN:   1. Routine history and physical examination of adult      2. Hypertension goal BP (blood pressure) < 140/90  Clonidine now.  Considered Prinzide but patient in acute pain from knee, so will start monotherapy.  Start hydrochlorothiazide and follow up in 1 month.   - EKG 12-lead complete w/read - Clinics  - Basic metabolic panel  - CBC with platelets  differential  - TSH with free T4 reflex  - cloNIDine (CATAPRES) 0.1 MG tablet; Take 1 tablet (0.1 mg) by mouth once for 1 dose  Dispense: 1 tablet; Refill: 0  - hydrochlorothiazide (HYDRODIURIL) 12.5 MG tablet; Take 1 tablet (12.5 mg) by mouth daily  Dispense: 30 tablet; Refill: 0    3. Morbid obesity (H)  Diet/exercise counseling.    4. Arthritis of knee  Try Tylenol first, start Physical Therapy as recommended. Ok for Tramadol for severe pain- no refills until follow up. Consider ortho referral if no improvement with Physical Therapy.  - traMADol (ULTRAM) 50 MG tablet; Take 1 tablet (50 mg) by mouth every 6 hours as needed for severe pain  Dispense: 15 tablet; Refill: 0    5. Achilles tendinitis, left leg  Recommend different shoes that don't rub on heel, start Physical Therapy.  - [facility], [facility], AND CHIROPRACTIC REFERRAL; Future    6. Changing skin lesion  Return for shave biopsy of chest mole    7. Skin tag  See ophthalmology for removal as they are around eyes    8. Peripheral edema    - order for DME; Equipment being ordered: Class 2 knee high Jobst compression stockings  Dispense: 1 each; Refill: 99    9. Screen for colon cancer    - Fecal colorectal cancer screen FIT; Future    10. Visit for screening mammogram    - MA SCREENING DIGITAL BILAT - Future  (s+30); Future    11. Need for vaccination    - TDAP VACCINE (ADACEL) [60525.002]    12. CARDIOVASCULAR SCREENING; LDL GOAL LESS THAN 160    - Lipid panel reflex to direct LDL Non-fasting  - Hemoglobin A1c    COUNSELING:  Reviewed preventive health counseling, as reflected in patient instructions       Regular exercise       Healthy diet/nutrition       Vision screening       Immunizations    Vaccinated for: TDAP and Declined: Influenza due to Conscientious objector               Colon cancer screening    BP Readings from Last 1 Encounters:   11/30/18 (!) 170/118     Estimated body mass index is 48.06 kg/(m^2) as calculated from the following:    Height as of  "this encounter: 5' 2.5\" (1.588 m).    Weight as of this encounter: 267 lb (121.1 kg).      Weight management plan: Discussed healthy diet and exercise guidelines     reports that she has never smoked. She has never used smokeless tobacco.      Counseling Resources:  ATP IV Guidelines  Pooled Cohorts Equation Calculator  Breast Cancer Risk Calculator  FRAX Risk Assessment  ICSI Preventive Guidelines  Dietary Guidelines for Americans, 2010  USDA's MyPlate  ASA Prophylaxis  Lung CA Screening    ANA [facility]3]  "

## 2018-12-01 ENCOUNTER — HEALTH MAINTENANCE LETTER (OUTPATIENT)
Age: 53
End: 2018-12-01

## 2018-12-03 ENCOUNTER — RADIANT APPOINTMENT (OUTPATIENT)
Dept: MAMMOGRAPHY | Facility: CLINIC | Age: 53
End: 2018-12-03
Attending: NURSE PRACTITIONER
Payer: COMMERCIAL

## 2018-12-03 DIAGNOSIS — Z12.31 VISIT FOR SCREENING MAMMOGRAM: ICD-10-CM

## 2018-12-03 PROBLEM — E03.9 HYPOTHYROIDISM, UNSPECIFIED TYPE: Status: ACTIVE | Noted: 2018-12-03

## 2018-12-03 PROCEDURE — 77067 SCR MAMMO BI INCL CAD: CPT | Mod: TC

## 2018-12-03 RX ORDER — LEVOTHYROXINE SODIUM 25 UG/1
25 TABLET ORAL DAILY
Qty: 90 TABLET | Refills: 0 | Status: SHIPPED | OUTPATIENT
Start: 2018-12-03 | End: 2019-01-21

## 2018-12-03 NOTE — PROGRESS NOTES
Please call the patient with these results:    -Seema, your labs show you have hypothyroidism. Please start Levothyroxine 25 mg daily and follow up with me in 6 weeks to recheck your bloodwork.  -You do have Prediabetes- regular exercise, a diet low in simple carbs and high in protein, and weight loss can further improve this.  -Cholesterol is mildly elevated but stable. Overall Cardiovascular risks are in the low risk range.  -Blood counts, kidney function, electrolytes are all normal.     The 10-year ASCVD risk score (Salisbury DC Jr, et al., 2013) is: 2.4%    Values used to calculate the score:      Age: 53 years      Sex: Female      Is Non- : No      Diabetic: No      Tobacco smoker: No      Systolic Blood Pressure: 146 mmHg      Is BP treated: No      HDL Cholesterol: 40 mg/dL      Total Cholesterol: 180 mg/dL      Becki Dunlap, CNP

## 2018-12-07 ENCOUNTER — THERAPY VISIT (OUTPATIENT)
Dept: PHYSICAL THERAPY | Facility: CLINIC | Age: 53
End: 2018-12-07
Attending: NURSE PRACTITIONER
Payer: COMMERCIAL

## 2018-12-07 DIAGNOSIS — M17.11 PRIMARY OSTEOARTHRITIS OF RIGHT KNEE: Primary | ICD-10-CM

## 2018-12-07 DIAGNOSIS — M76.62 ACHILLES TENDINITIS, LEFT LEG: ICD-10-CM

## 2018-12-07 PROCEDURE — 97110 THERAPEUTIC EXERCISES: CPT | Mod: GP | Performed by: PHYSICAL THERAPIST

## 2018-12-07 PROCEDURE — 97161 PT EVAL LOW COMPLEX 20 MIN: CPT | Mod: GP | Performed by: PHYSICAL THERAPIST

## 2018-12-07 ASSESSMENT — ACTIVITIES OF DAILY LIVING (ADL)
SIT WITH YOUR KNEE BENT: ACTIVITY IS VERY DIFFICULT
GO DOWN STAIRS: ACTIVITY IS VERY DIFFICULT
LIMPING: I DO NOT HAVE THE SYMPTOM
WEAKNESS: I HAVE THE SYMPTOM BUT IT DOES NOT AFFECT MY ACTIVITY
WALK: ACTIVITY IS VERY DIFFICULT
KNEE_ACTIVITY_OF_DAILY_LIVING_SCORE: 37.14
AS_A_RESULT_OF_YOUR_KNEE_INJURY,_HOW_WOULD_YOU_RATE_YOUR_CURRENT_LEVEL_OF_DAILY_ACTIVITY?: SEVERELY ABNORMAL
HOW_WOULD_YOU_RATE_THE_CURRENT_FUNCTION_OF_YOUR_KNEE_DURING_YOUR_USUAL_DAILY_ACTIVITIES_ON_A_SCALE_FROM_0_TO_100_WITH_100_BEING_YOUR_LEVEL_OF_KNEE_FUNCTION_PRIOR_TO_YOUR_INJURY_AND_0_BEING_THE_INABILITY_TO_PERFORM_ANY_OF_YOUR_USUAL_DAILY_ACTIVITIES?: 50
RISE FROM A CHAIR: ACTIVITY IS VERY DIFFICULT
KNEEL ON THE FRONT OF YOUR KNEE: I AM UNABLE TO DO THE ACTIVITY
SWELLING: THE SYMPTOM AFFECTS MY ACTIVITY SEVERELY
PAIN: THE SYMPTOM AFFECTS MY ACTIVITY SEVERELY
KNEE_ACTIVITY_OF_DAILY_LIVING_SUM: 26
GO UP STAIRS: ACTIVITY IS VERY DIFFICULT
STIFFNESS: I DO NOT HAVE THE SYMPTOM
RAW_SCORE: 26
GIVING WAY, BUCKLING OR SHIFTING OF KNEE: THE SYMPTOM AFFECTS MY ACTIVITY SLIGHTLY
SQUAT: ACTIVITY IS VERY DIFFICULT
HOW_WOULD_YOU_RATE_THE_OVERALL_FUNCTION_OF_YOUR_KNEE_DURING_YOUR_USUAL_DAILY_ACTIVITIES?: SEVERELY ABNORMAL
STAND: ACTIVITY IS VERY DIFFICULT

## 2018-12-07 NOTE — MR AVS SNAPSHOT
After Visit Summary   12/7/2018    Seema Babin    MRN: [id number]           Patient Information     Date Of Birth          1965        Visit Information        Provider Department      12/7/2018 8:10 AM Maxwell Spencer, PT [facility] [place] [facility]        Today's Diagnoses     Primary osteoarthritis of right knee    -  1    Achilles tendinitis, left leg           Follow-ups after your visit        Your next 10 appointments already scheduled     Dec 13, 2018  7:30 AM CST   [facility] Extremity with Maxwell Croninol, PT   [facility] ([facility] [place])    [address]  [facility] [address]   [phone]            Dec 20, 2018  7:30 AM CST   [facility] Extremity with Maxwell Spencer, PT   [facility] [place] [facility] ([facility] [place])    [address] [address]   [phone]            Jan 14, 2019  7:40 AM CST   Office Visit with ANA [facility] ([facility])    [address] [facility] [address]   [phone]           Bring a current list of meds and any records pertaining to this visit. For Physicals, please bring immunization records and any forms needing to be filled out. Please arrive 10 minutes early to complete paperwork.              Who to contact     If you have questions or need follow up information about today's clinic visit or your schedule please contact [facility] directly at [phone].  Normal or non-critical lab and imaging results will be communicated to you by Storybirdhart, letter or phone within 4 business days after the clinic has received the results. If you do not hear from us within 7 days, please contact the clinic through Storybirdhart or phone. If you have a critical or abnormal lab result, we will notify you by phone as soon as possible.  Submit refill requests through minicabit or call your pharmacy and they will forward the refill request to us. Please allow 3 business days for your  refill to be completed.          Additional Information About Your Visit        MyChart Information     [employer] gives you secure access to your electronic health record. If you see a primary care provider, you can also send messages to your care team and make appointments. If you have questions, please call your primary care clinic.  If you do not have a primary care provider, please call [phone] and they will assist you.        Care EveryWhere ID     This is your Care EveryWhere ID. This could be used by other organizations to access your [place] medical records  [id number]         Blood Pressure from Last 3 Encounters:   11/30/18 (!) 146/92   10/30/18 (!) 137/91   08/04/17 (!) 155/95    Weight from Last 3 Encounters:   11/30/18 121.1 kg (267 lb)   10/30/18 117.4 kg (258 lb 12.8 oz)   12/27/16 103.9 kg (229 lb)              We Performed the Following     [facility] PT EVAL, LOW COMPLEXITY     [facility] INITIAL EVAL REPORT     [facility] PT, HAND, AND CHIROPRACTIC REFERRAL     THERAPEUTIC EXERCISES        Primary Care Provider Office Phone # Fax #    Becki Gloria Dunlap, APRN [phone]       [address] [facility]  [place] [address]        Equal Access to Services     BESSY WOOTEN : Hadii aad ku hadasho Soomaali, waaxda luqadaha, qaybta kaalmada adeegyada, waxay idiin hayaan cristi khemanuel la'sam . So [facility] [phone].    ATENCIÓN: Si habla español, tiene a becerril disposición servicios gratuitos de asistencia lingüística. Llshaun al [phone].    We comply with applicable federal civil rights laws and [place] laws. We do not discriminate on the basis of race, color, national origin, age, disability, sex, sexual orientation, or gender identity.            Thank you!     Thank you for choosing [facility]  for your care. Our goal is always to provide you with excellent care. Hearing back from our patients is one way we can continue to improve our services. Please take a few minutes to complete the written  survey that you may receive in the mail after your visit with us. Thank you!             Your Updated Medication List - Protect others around you: Learn how to safely use, store and throw away your medicines at www.disposemymeds.org.          This list is accurate as of 12/7/18 10:07 AM.  Always use your most recent med list.                   Brand Name Dispense Instructions for use Diagnosis    cetirizine 10 MG tablet    zyrTEC    30 tablet    Take 1 tablet (10 mg) by mouth daily        cloNIDine 0.1 MG tablet    CATAPRES    1 tablet    Take 1 tablet (0.1 mg) by mouth once for 1 dose    Hypertension goal BP (blood pressure) < 140/90       hydrochlorothiazide 12.5 MG tablet    HYDRODIURIL    30 tablet    Take 1 tablet (12.5 mg) by mouth daily    Hypertension goal BP (blood pressure) < 140/90       levothyroxine 25 MCG tablet    SYNTHROID/LEVOTHROID    90 tablet    Take 1 tablet (25 mcg) by mouth daily appt required for addt'l refills    Hypothyroidism, unspecified type       order for DME     1 each    Equipment being ordered: Class 2 knee high Jobst compression stockings    Peripheral edema       traMADol 50 MG tablet    ULTRAM    15 tablet    Take 1 tablet (50 mg) by mouth every 6 hours as needed for severe pain    Arthritis of knee

## 2018-12-07 NOTE — LETTER
[facility]  [address] [facility] [facility] [address] [facility]  [address]  [place] [address]  [phone]    2018    Re: Seema Babin   :   1965  MRN:  [id number]   REFERRING PHYSICIAN:   Becki Dunlap, CNP    [facility]  Date of Initial Evaluation:  2018  Visits:  Rxs Used: 1  Reason for Referral:     Achilles tendinitis, left leg  Primary osteoarthritis of right knee    EVALUATION SUMMARY    [facility] Initial Evaluation  Subjective:  Patient is a 53 year old female presenting with rehab right ankle/foot hpi and rehab right knee hpi. The history is provided by the patient.   Seema Babin is a 53 year old female with a right knee condition.  Condition occurred with:  Degenerative joint disease.  Condition occurred: at home.  This is a chronic condition  Pt reports she has had Rt knee pain for about 6 months. She also reports left achilles pain for over a year. She is referred for both. Xray reveal chondral erosion on the Rt knee, medial compartment. .    Patient reports pain:  In the joint and anterior. Pain is described as sharp and aching and is intermittent and reported as 10/10.  Associated symptoms:  Loss of motion/stiffness.   Symptoms are exacerbated by walking, standing, descending stairs, ascending stairs and bending/squatting   General health as reported by patient is good.  Pertinent medical history includes:  Overweight, high blood pressure and thyroid problems.   Current medications:  Muscle relaxants, anti-inflammatory, high blood pressure medication and thyroid medication.  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  Primary job tasks include:  Prolonged sitting.    Barriers include:  None as reported by the patient.  Red flags:  None as reported by the patient.    Objective:  Ankle/Foot Evaluation  ROM:  AROM is normal.PROM is normal.    PALPATION: Palpation of ankle: +deformity at achilles insert,  +++++TTP.  Left ankle tenderness present at:  gastroc/soleus and achilles tendon    EDEMA: Edema ankle: +deformity at calcan.            Re: Seema Babin   :   1965       Knee Evaluation:  ROM:  AROM: normal  PROM: normal  Strength:  Normal  Ligament Testing:    Varus 0:  Right:  Gr I and Pos  Varus 30:  Right:  Gr I and Pos  Valgus 0:  Right:  Gr I and Pos  Valgus 30:  Right:  Gr I and Pos  Anterior Drawer:  Right:  Neg  Posterior Drawer: Right:  Neg    Special Tests:   Right knee negative for the following special tests:  Patellar Compression; Yanni's and IT Band Friction    Palpation:    Right knee tenderness present at:  Medial Joint Line; Lateral Joint Line and Semitendinosus  Right knee tenderness not present at:  Patellar Tendon; IT Band; Patellar Medial and Patellar Lateral    Edema:  Normal    Mobility Testing:    Patellofemoral Medial:  Right: hypomobile  Patellofemoral Lateral:  Right: hypomobile  Patellofemoral Superior:  Right: hypomobile  Patellofemoral Inferior:  Right: hypomobile    Functional Testing:  : antalgic gait,     Assessment/Plan:    Patient is a 53 year old female with right side knee and left side ankle complaints.    Patient has the following significant findings with corresponding treatment plan.                Diagnosis 1:  Rt Knee OA/pain   Pain -  hot/cold therapy, US, electric stimulation, manual therapy, splint/taping/bracing/orthotics, self management and home program  Decreased proprioception - neuro re-education and therapeutic activities  Inflammation - cold therapy, US, electric stimulation and iontophoresis  Edema - cold therapy  Impaired gait - gait training and home program  Impaired muscle performance - neuro re-education  Decreased function - therapeutic activities  Instability -  Therapeutic Activity  Therapeutic Exercise  Diagnosis 2:  Achilles tendonitis left    Pain -  hot/cold therapy, US, electric stimulation, manual therapy, self management and home  program  Decreased ROM/flexibility - manual therapy, therapeutic exercise and home program  Inflammation - cold therapy and US  Impaired muscle performance - electric stimulation, neuro re-education and home program  Decreased function - therapeutic activities    Re: Seema Babin   :   1965    Therapy Evaluation Codes:   1) History comprised of:   Personal factors that impact the plan of care:      Coping style, Overall behavior pattern and Past/current experiences.    Comorbidity factors that impact the plan of care are:      High blood pressure, Osteoarthritis, Overweight and thyroid .     Medications impacting care: Anti-inflammatory, High blood pressure, Muscle relaxant and Pain.  2) Examination of Body Systems comprised of:   Body structures and functions that impact the plan of care:      Ankle and Knee.   Activity limitations that impact the plan of care are:      Cooking, Stairs, Standing, Walking and Working.  3) Clinical presentation characteristics are:   Stable/Uncomplicated.  4) Decision-Making    Low complexity using standardized patient assessment instrument and/or measureable assessment of functional outcome.  Cumulative Therapy Evaluation is: Low complexity.    Previous and current functional limitations:  (See Goal Flow Sheet for this information)    Short term and Long term goals: (See Goal Flow Sheet for this information)     Communication ability:  Patient appears to be able to clearly communicate and understand verbal and written communication and follow directions correctly.  Treatment Explanation - The following has been discussed with the patient:   RX ordered/plan of care  Anticipated outcomes  Possible risks and side effects  This patient would benefit from PT intervention to resume normal activities.   Rehab potential is good.    Frequency:  1 X week, once daily  Duration:  for 8 weeks  Discharge Plan:  Achieve all LTG.  Independent in home treatment program.  Reach maximal  therapeutic benefit.    Please refer to the daily flowsheet for treatment today, total treatment time and time spent performing 1:1 timed codes.     Thank you for your referral.    INQUIRIES  Therapist: Maxwell Spencer PT   [facility]  [address] [facility]  [address]  [place] [address]  Phone: [phone]  Fax: [phone]

## 2018-12-07 NOTE — PROGRESS NOTES
[facility] Initial Evaluation  Subjective:  Patient is a 53 year old female presenting with rehab right ankle/foot hpi and rehab right knee hpi. The history is provided by the patient.   Seema Babin is a 53 year old female with a right knee condition.  Condition occurred with:  Degenerative joint disease.  Condition occurred: at home.  This is a chronic condition  Pt reports she has had Rt knee pain for about 6 months. She also reports left achilles pain for over a year. She is referred for both. Xray reveal chondral erosion on the Rt knee, medial compartment. .    Patient reports pain:  In the joint and anterior.    Pain is described as sharp and aching and is intermittent and reported as 10/10.  Associated symptoms:  Loss of motion/stiffness.   Symptoms are exacerbated by walking, standing, descending stairs, ascending stairs and bending/squatting           General health as reported by patient is good.  Pertinent medical history includes:  Overweight, high blood pressure and thyroid problems.      Current medications:  Muscle relaxants, anti-inflammatory, high blood pressure medication and thyroid medication.  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  Primary job tasks include:  Prolonged sitting.    Barriers include:  None as reported by the patient.    Red flags:  None as reported by the patient.                        Objective:  System    Ankle/Foot Evaluation  ROM:  AROM is normal.PROM is normal.            PALPATION: Palpation of ankle: +deformity at achilles insert, +++++TTP.  Left ankle tenderness present at:  gastroc/soleus and achilles tendon    EDEMA: Edema ankle: +deformity at calcan.                                                        Knee Evaluation:  ROM:  AROM: normal  PROM: normal  Strength:  Normal            Ligament Testing:    Varus 0:  Right:  Gr I and Pos  Varus 30:  Right:  Gr I and Pos  Valgus 0:  Right:   Gr I and Pos  Valgus 30:  Right:  Gr I and Pos  Anterior Drawer:  Right:  Neg  Posterior Drawer: Right:  Neg    Special Tests:       Right knee negative for the following special tests:  Patellar Compression; Yanni's and IT Band Friction  Palpation:      Right knee tenderness present at:  Medial Joint Line; Lateral Joint Line and Semitendinosus  Right knee tenderness not present at:  Patellar Tendon; IT Band; Patellar Medial and Patellar Lateral  Edema:  Normal    Mobility Testing:      Patellofemoral Medial:  Right: hypomobile  Patellofemoral Lateral:  Right: hypomobile  Patellofemoral Superior:  Right: hypomobile  Patellofemoral Inferior:  Right: hypomobile  Functional Testing:  : antalgic gait,                   General     ROS    Assessment/Plan:    Patient is a 53 year old female with right side knee and left side ankle complaints.    Patient has the following significant findings with corresponding treatment plan.                Diagnosis 1:  Rt Knee OA/pain   Pain -  hot/cold therapy, US, electric stimulation, manual therapy, splint/taping/bracing/orthotics, self management and home program  Decreased proprioception - neuro re-education and therapeutic activities  Inflammation - cold therapy, US, electric stimulation and iontophoresis  Edema - cold therapy  Impaired gait - gait training and home program  Impaired muscle performance - neuro re-education  Decreased function - therapeutic activities  Instability -  Therapeutic Activity  Therapeutic Exercise  Diagnosis 2:  Achilles tendonitis left    Pain -  hot/cold therapy, US, electric stimulation, manual therapy, self management and home program  Decreased ROM/flexibility - manual therapy, therapeutic exercise and home program  Inflammation - cold therapy and US  Impaired muscle performance - electric stimulation, neuro re-education and home program  Decreased function - therapeutic activities    Therapy Evaluation Codes:   1) History comprised of:   Personal  factors that impact the plan of care:      Coping style, Overall behavior pattern and Past/current experiences.    Comorbidity factors that impact the plan of care are:      High blood pressure, Osteoarthritis, Overweight and thyroid .     Medications impacting care: Anti-inflammatory, High blood pressure, Muscle relaxant and Pain.  2) Examination of Body Systems comprised of:   Body structures and functions that impact the plan of care:      Ankle and Knee.   Activity limitations that impact the plan of care are:      Cooking, Stairs, Standing, Walking and Working.  3) Clinical presentation characteristics are:   Stable/Uncomplicated.  4) Decision-Making    Low complexity using standardized patient assessment instrument and/or measureable assessment of functional outcome.  Cumulative Therapy Evaluation is: Low complexity.    Previous and current functional limitations:  (See Goal Flow Sheet for this information)    Short term and Long term goals: (See Goal Flow Sheet for this information)     Communication ability:  Patient appears to be able to clearly communicate and understand verbal and written communication and follow directions correctly.  Treatment Explanation - The following has been discussed with the patient:   RX ordered/plan of care  Anticipated outcomes  Possible risks and side effects  This patient would benefit from PT intervention to resume normal activities.   Rehab potential is good.    Frequency:  1 X week, once daily  Duration:  for 8 weeks  Discharge Plan:  Achieve all LTG.  Independent in home treatment program.  Reach maximal therapeutic benefit.    Please refer to the daily flowsheet for treatment today, total treatment time and time spent performing 1:1 timed codes.

## 2018-12-12 ENCOUNTER — TELEPHONE (OUTPATIENT)
Dept: FAMILY MEDICINE | Facility: CLINIC | Age: 53
End: 2018-12-12

## 2018-12-12 NOTE — TELEPHONE ENCOUNTER
Panel Management Review      Patient has the following on her problem list:     Hypertension   Last three blood pressure readings:  BP Readings from Last 3 Encounters:   11/30/18 (!) 146/92   10/30/18 (!) 137/91   08/04/17 (!) 155/95     Blood pressure: FAILED    HTN Guidelines:  Age 18-59 BP range:  Less than 140/90  Age 60-85 with Diabetes:  Less than 140/90  Age 60-85 without Diabetes:  less than 150/90      Composite cancer screening  Chart review shows that this patient is due/due soon for the following Fecal Colorectal (FIT)  Summary:    Patient is due/failing the following:   BP CHECK and FIT    Action needed:   Patient needs office visit for FIT testing and BP check.    Type of outreach:    Sent [employer] message.    Questions for provider review:    None                                                                                                                                    Harley Brown CMA on 12/12/2018 at 12:50 PM       Chart routed to NONE .

## 2018-12-13 ENCOUNTER — THERAPY VISIT (OUTPATIENT)
Dept: PHYSICAL THERAPY | Facility: CLINIC | Age: 53
End: 2018-12-13
Payer: COMMERCIAL

## 2018-12-13 DIAGNOSIS — M17.11 PRIMARY OSTEOARTHRITIS OF RIGHT KNEE: ICD-10-CM

## 2018-12-13 PROCEDURE — 97035 APP MDLTY 1+ULTRASOUND EA 15: CPT | Mod: GP | Performed by: PHYSICAL THERAPIST

## 2018-12-13 PROCEDURE — 97110 THERAPEUTIC EXERCISES: CPT | Mod: GP | Performed by: PHYSICAL THERAPIST

## 2018-12-13 PROCEDURE — 97140 MANUAL THERAPY 1/> REGIONS: CPT | Mod: GP | Performed by: PHYSICAL THERAPIST

## 2018-12-20 ENCOUNTER — THERAPY VISIT (OUTPATIENT)
Dept: PHYSICAL THERAPY | Facility: CLINIC | Age: 53
End: 2018-12-20
Payer: COMMERCIAL

## 2018-12-20 DIAGNOSIS — M17.11 PRIMARY OSTEOARTHRITIS OF RIGHT KNEE: ICD-10-CM

## 2018-12-20 PROCEDURE — 97035 APP MDLTY 1+ULTRASOUND EA 15: CPT | Mod: GP | Performed by: PHYSICAL THERAPIST

## 2018-12-20 PROCEDURE — 97110 THERAPEUTIC EXERCISES: CPT | Mod: GP | Performed by: PHYSICAL THERAPIST

## 2018-12-20 PROCEDURE — 97140 MANUAL THERAPY 1/> REGIONS: CPT | Mod: GP | Performed by: PHYSICAL THERAPIST

## 2018-12-27 ENCOUNTER — OFFICE VISIT (OUTPATIENT)
Dept: ORTHOPEDICS | Facility: CLINIC | Age: 53
End: 2018-12-27
Payer: COMMERCIAL

## 2018-12-27 VITALS
DIASTOLIC BLOOD PRESSURE: 93 MMHG | WEIGHT: 265 LBS | BODY MASS INDEX: 46.95 KG/M2 | RESPIRATION RATE: 14 BRPM | HEIGHT: 63 IN | SYSTOLIC BLOOD PRESSURE: 162 MMHG

## 2018-12-27 DIAGNOSIS — M17.11 PRIMARY OSTEOARTHRITIS OF RIGHT KNEE: Primary | ICD-10-CM

## 2018-12-27 PROCEDURE — 99243 OFF/OP CNSLTJ NEW/EST LOW 30: CPT | Mod: 25 | Performed by: ORTHOPAEDIC SURGERY

## 2018-12-27 PROCEDURE — 20610 DRAIN/INJ JOINT/BURSA W/O US: CPT | Mod: RT | Performed by: ORTHOPAEDIC SURGERY

## 2018-12-27 ASSESSMENT — MIFFLIN-ST. JEOR: SCORE: 1781.16

## 2018-12-27 NOTE — PATIENT INSTRUCTIONS
Diagnosis  Chondromalacia patella.  Glucosamine 1500 mg/day and chondroitin sulfate 1200 mg/day advised.  Avoid kneeling and crawling.  Do quadriceps strengthening (especially VMO) .  Do hip abduction strengthening.  Use anti-inflammatories as needed.    You have had a steroid injection today.  For the first 2 hours there will likely be some numbing in the joint from the lidocaine.  This is a good sign, indicating that the injection is in the right place.  In 2 hours the lidocaine will wear off, and the joint will hurt like you had a shot.  Each day the cortisone makes it feel better.  It reaches peak effect in 2 weeks.  We expect it to last for 3 months.  You may resume regular activity when you feel ready.  If you are diabetic, your glucoses will be quite high for several days.                      Patellofemoral Pain Syndrome (Runner's Knee)             What is patellofemoral pain syndrome?   Patellofemoral pain syndrome is pain behind the kneecap. It may also be called patellofemoral disorder, patellar malalignment, runner's knee, and chondromalacia.   How does it occur?   Patellofemoral pain syndrome can occur from overuse of the knee in sports and activities such as running, walking, jumping, or bicycling.   The kneecap (patella) is attached to the large group of muscles in the thigh called the quadriceps. It is also attached to the shin bone by the patellar tendon. The kneecap fits into grooves in the end of the thigh bone (femur) called the femoral condyle. With repeated bending and straightening of the knee, you can irritate the inside surface of the kneecap and cause pain.   Patellofemoral pain syndrome also may result from the way your hips, legs, knees, or feet are aligned. For example, if you have wide hips or underdeveloped thigh muscles, or if you are knock-kneed You may also have this problem if your foot flattens too much when you walk or run (a condition called over-pronation).   What are the  symptoms?   The main symptom is pain behind the kneecap. You may have pain when you walk, run, or sit for a long time. The pain is usually worse when you walk downhill or down stairs. Your knee may swell at times. You may feel or hear snapping, popping, or grinding in the knee.   How is it diagnosed?   Your healthcare provider will review your symptoms and examine your knee. You will have knee X-rays. You may have an MRI to check for damage to the surface of the patella or femur or another injury.   How is it treated?   Treatment includes the following:   Put an ice pack, gel pack, or package of frozen vegetables, wrapped in a cloth on the area every 3 to 4 hours, for up to 20 minutes at a time.   Raise the knee on a pillow when you sit or lie down.   Take an anti-inflammatory medicine such as ibuprofen, or other medicine as directed by your provider. Nonsteroidal anti-inflammatory medicines (NSAIDs) may cause stomach bleeding and other problems. These risks increase with age. Read the label and take as directed. Unless recommended by your healthcare provider, do not take for more than 10 days.   Follow your provider's instructions for doing exercises to help you recover. Your healthcare provider will show you exercises to help decrease the pain behind your kneecap.   If you over-pronate, your healthcare provider may recommend shoe inserts, called orthotics. You can buy orthotics at a pharmacy or athletic shoe store or they can be custom-made.   Use an infrapatellar strap, a strap placed below the kneecap over the patellar tendon.   Wear a neoprene knee sleeve, which will give support to your knee and patella.   While you recover from your injury, you will need to change your sport or activity to one that does not make your condition worse. For example, you may need to bicycle or swim instead of run.   In cases of severe patellofemoral pain syndrome, surgery may be recommended.   How long will the effects last?    Patellofemoral pain often lasts a long time and can come back after symptoms were better for a while. Treatment requires proper rehabilitation exercises that are done regularly.   When can I return to my normal activities?   Everyone recovers from an injury at a different rate. Return to your activities depends on how soon your knee recovers, not by how many days or weeks it has been since your injury has occurred. In general, the longer you have symptoms before you start treatment, the longer it will take to get better. The goal is to return you to your normal activities as soon as is safely possible. If you return too soon you may worsen your injury.   You may safely return to your normal activities when, starting from the top of the list and progressing to the end, each of the following is true:   Your injured knee can be fully straightened and bent without pain.   Your knee and leg have regained normal strength compared to the uninjured knee and leg.   You are able to walk, bend, and squat without pain.   How can I prevent runner's knee?   Runner's knee can best be prevented by strengthening your thigh muscles, particularly the inside part of this muscle group. It is also important to wear shoes that fit well and that have good arch supports.     Published by [employer]  This content is reviewed periodically and is subject to change as new health information becomes available. The information is intended to inform and educate and is not a replacement for medical evaluation, advice, diagnosis or treatment by a healthcare professional.   Written by Armaan Gomez MD, for [employer]   ? 2010 [facility] and/or its affiliates. All Rights Reserved.   Copyright   [employer] Reference Systems 2011  Adult Health Advisor    Patellofemoral Pain Syndrome (Runner's Knee) Rehabilitation Exercises              You can do the hamstring stretch right away. When the pain in your knee has decreased, you can do the quadriceps  stretch and start strengthening the thigh muscles using the rest of the exercises.   Standing hamstring stretch: Put the heel of the leg on your injured side on a stool about 15 inches high. Keep your leg straight. Lean forward, bending at the hips, until you feel a mild stretch in the back of your thigh. Make sure you don't roll your shoulders or bend at the waist when doing this or you will stretch your lower back instead of your leg. Hold the stretch for 15 to 30 seconds. Repeat 3 times.   Quadriceps stretch: Stand an arm's length away from the wall with your injured leg farthest from the wall. Facing straight ahead, brace yourself by keeping one hand against the wall. With your other hand, grasp the ankle of your injured leg and pull your heel toward your buttocks. Don't arch or twist your back. Keep your knees together. Hold this stretch for 15 to 30 seconds.   Side-lying leg lift: Lie on your uninjured side. Tighten the front thigh muscles on your injured leg and lift that leg 8 to 10 inches away from the other leg. Keep the leg straight and lower it slowly. Do 3 sets of 10.   Quad sets: Sit on the floor with your injured leg straight and your other leg bent. Press the back of the knee of your injured leg against the floor by tightening the muscles on the top of your thigh. Hold this position 10 seconds. Relax. Do 3 sets of 10.   Straight leg raise: Lie on your back with your legs straight out in front of you. Bend the knee on your uninjured side and place the foot flat on the floor. Tighten the thigh muscle on your injured side and lift your leg about 8 inches off the floor. Keep your leg straight and your thigh muscle tight. Slowly lower your leg back down to the floor. Do 3 sets of 10.   Step-up: Stand with the foot of your injured leg on a support 3 to 5 inches high (like a small step or block of wood). Keep your other foot flat on the floor. Shift your weight onto the injured leg on the support.  Straighten your injured leg as the other leg comes off the floor. Return to the starting position by bending your injured leg and slowly lowering your uninjured leg back to the floor. Do 3 sets of 10.   Wall squat with a ball: Stand with your back, shoulders, and head against a wall. Look straight ahead. Keep your shoulders relaxed and your feet 3 feet from the wall and shoulder's width apart. Place a soccer or basketball-sized ball behind your back. Keeping your back against the wall, slowly squat down to a 45-degree angle. Your thighs will not yet be parallel to the floor. Hold this position for 10 seconds and then slowly slide back up the wall. Repeat 10 times. Build up to 3 sets of 10.   Knee stabilization: Wrap a piece of elastic tubing around the ankle of the uninjured leg. Tie a knot in the other end of the tubing and close it in a door.   0. Stand facing the door on the leg without tubing and bend your knee slightly, keeping your thigh muscles tight. While maintaining this position, move the leg with the tubing straight back behind you. Do 3 sets of 10.   0. Turn 90 degrees so the leg without tubing is closest to the door. Move the leg with tubing away from your body. Do 3 sets of 10.   0. Turn 90 degrees again so your back is to the door. Move the leg with tubing straight out in front of you. Do 3 sets of 10.   0. Turn your body 90 degrees again so the leg with tubing is closest to the door. Move the leg with tubing across your body. Do 3 sets of 10.   Hold onto a chair if you need help balancing. This exercise can be made even more challenging by standing on a pillow while you move the leg with tubing.   Resisted terminal knee extension: Make a loop from a piece of elastic tubing by tying a knot in both ends. Close both knots in a door. Step into the loop so the tubing is around the back of your injured leg. Lift the other foot off the ground. Hold onto a chair for balance, if needed. Bend the knee on the  leg with tubing about 45 degrees. Slowly straighten your leg, keeping your thigh muscle tight as you do this. Do this 10 times. Do 3 sets. An easier way to do this is to stand on both legs for better support while you do the exercise.   Standing calf stretch: Stand facing a wall with your hands on the wall at about eye level. Keep your injured leg back with your heel on the floor. Keep the other leg forward with the knee bent. Turn your back foot slightly inward (as if you were pigeon-toed). Slowly lean into the wall until you feel a stretch in the back of your calf. Hold the stretch for 15 to 30 seconds. Return to the starting position. Repeat 3 times. Do this exercise several times each day.   Clam exercise: Lie on your uninjured side with your hips and knees bent and feet together. Slowly raise your top leg toward the ceiling while keeping your heels touching each other. Hold for 2 seconds and lower slowly. Do 3 sets of 10 repetitions.   Iliotibial band stretch: Side-bending: Cross one leg in front of the other leg and lean in the opposite direction from the front leg. Reach the arm on the side of the back leg over your head while you do this. Hold this position for 15 to 30 seconds. Return to the starting position. Repeat 3 times and then switch legs and repeat the exercise.   Published by [employer].  This content is reviewed periodically and is subject to change as new health information becomes available. The information is intended to inform and educate and is not a replacement for medical evaluation, advice, diagnosis or treatment by a healthcare professional.   Written by Shannan Ovalle MS, PT, and Trinidad Tejeda PT, [facility], [facility], for [employer].   ? 2010 [facility] and/or its affiliates. All Rights Reserved.   Copyright   [employer] Reference Systems 2011  Adult Health Advisor                                    Strengthening exercises:  Start abductor strengthening and begin the exercises demonstrated  today.  Leg strengthening:  Hold onto the sink with painful leg toward the sink.  Lift painful leg out to touch the wall with the heel.  Lift 10-15 times, twice a day.

## 2018-12-27 NOTE — LETTER
2018         RE: Seema Babin  [address] [address]        Dear Colleague,    Thank you for referring your patient, Seema Babin, to the [facility]. Please see a copy of my visit note below.    Patient to follow up with Primary Care provider regarding elevated blood pressure.    The patient's right knee was prepped with betadine solution after verification of allergies. Area approximately 10 cm x 10 cm prepped in a sterile fashion. After injection, betadine removed with soap and water and band-aids applied.    1ml depo-medrol with 1% lidocaine plain injected into patient's right knee by Dr. Luke Hardy  LOT# [id number]  Exp.     Mike Watson PA-C  Supervising physician: Luke Hardy MD  Dept. of Orthopedics  [facility]          Seema Babin is a 53 year old female who is seen in consultation at the request of Becki Dunlap  for right knee pain.     Past Medical History:   Diagnosis Date     Hypertension goal BP (blood pressure) < 140/90        Past Surgical History:   Procedure Laterality Date      SECTION       HC TOOTH EXTRACTION W/FORCEP       TUBAL LIGATION         Family History   Problem Relation Age of Onset     Diabetes Mother      Diabetes Sister      Diabetes Brother      Diabetes Maternal Grandmother      Breast Cancer No family hx of      Colon Cancer No family hx of      Prostate Cancer No family hx of        Social History     Socioeconomic History     Marital status: Single     Spouse name: Not on file     Number of children: Not on file     Years of education: Not on file     Highest education level: Not on file   Social Needs     Financial resource strain: Not on file     Food insecurity - worry: Not on file     Food insecurity - inability: Not on file     Transportation needs - medical: Not on file     Transportation needs - non-medical: Not on file   Occupational History     Not on file    Tobacco Use     Smoking status: Never Smoker     Smokeless tobacco: Never Used   Substance and Sexual Activity     Alcohol use: Yes     Comment: wine now and then     Drug use: No     Sexual activity: Not on file   Other Topics Concern     Parent/sibling w/ CABG, MI or angioplasty before 65F 55M? Not Asked   Social History Narrative     Not on file       Current Outpatient Medications   Medication Sig Dispense Refill     cetirizine (ZYRTEC) 10 MG tablet Take 1 tablet (10 mg) by mouth daily 30 tablet      cloNIDine (CATAPRES) 0.1 MG tablet Take 1 tablet (0.1 mg) by mouth once for 1 dose 1 tablet 0     hydrochlorothiazide (HYDRODIURIL) 12.5 MG tablet Take 1 tablet (12.5 mg) by mouth daily 30 tablet 0     levothyroxine (SYNTHROID/LEVOTHROID) 25 MCG tablet Take 1 tablet (25 mcg) by mouth daily appt required for addt'l refills 90 tablet 0     order for DME Equipment being ordered: Class 2 knee high Jobst compression stockings 1 each 99     traMADol (ULTRAM) 50 MG tablet Take 1 tablet (50 mg) by mouth every 6 hours as needed for severe pain 15 tablet 0       Allergies   Allergen Reactions     Sulfa Drugs      Vicodin [Hydrocodone-Acetaminophen]        REVIEW OF SYSTEMS:  CONSTITUTIONAL:  NEGATIVE for fever, chills, change in weight, not feeling tired  SKIN:  NEGATIVE for worrisome rashes, no skin lumps, no skin ulcers and no non-healing wounds  EYES:  NEGATIVE for vision changes or irritation.  ENT/MOUTH:  NEGATIVE.  No hearing loss, no hoarseness, no difficulty swallowing.  RESP:  NEGATIVE. No cough or shortness of breath.  CV:  NEGATIVE for chest pain, palpitations or peripheral edema  GI:  NEGATIVE for nausea, abdominal pain, heartburn, or change in bowel habits  :  Negative. No dysuria, no hematuria  MUSCULOSKELETAL:  See HPI above  NEURO:  NEGATIVE . No headaches, no dizziness,  no numbness  ENDOCRINE:  NEGATIVE for temperature intolerance, skin/hair changes  HEME/ALLERGY/IMMUNE:  NEGATIVE for bleeding  "problems  PSYCHIATRIC:  NEGATIVE. no anxiety, no depression.      Exam:  Vitals: BP (!) 162/93 (BP Location: Left arm)   Resp 14   Ht 1.6 m (5' 3\")   Wt 120.2 kg (265 lb)   BMI 46.94 kg/m     BMI= Body mass index is 46.94 kg/m .  Constitutional:  healthy, alert and no distress  Neuro: Alert and Oriented x 3, Sensation grossly WNL.  HEENT:  Atraumatic, EOMI  Neck:  Neck supple with no tenderness.  Psych: Affect normal   Respiratory: Breathing not labored.  Cardiovascular: normal peripheral pulses  Lymph: no adenopathy  Skin: No rashes,worrisome lesions or skin problems  Spine: straight, no straight leg raising pain.  Hips show full range of motion.  There is no tenderness over the sacro-iliac joints, sciatic notch, or greater trochanters.       Again, thank you for allowing me to participate in the care of your patient.        Sincerely,        Luke Hardy MD    "

## 2018-12-27 NOTE — PROGRESS NOTES
Patient to follow up with Primary Care provider regarding elevated blood pressure.    The patient's right knee was prepped with betadine solution after verification of allergies. Area approximately 10 cm x 10 cm prepped in a sterile fashion. After injection, betadine removed with soap and water and band-aids applied.    1ml depo-medrol with 1% lidocaine plain injected into patient's right knee by Dr. Luke Hardy  LOT# [id number]  Exp. 12/19    Mike Watson PA-C  Supervising physician: Luke Hardy MD  Dept. of Orthopedics  [facility]

## 2018-12-28 RX ORDER — METHYLPREDNISOLONE ACETATE 80 MG/ML
80 INJECTION, SUSPENSION INTRA-ARTICULAR; INTRALESIONAL; INTRAMUSCULAR; SOFT TISSUE ONCE
Qty: 1 ML | Refills: 0 | OUTPATIENT
Start: 2018-12-28 | End: 2019-02-19

## 2018-12-28 NOTE — PROGRESS NOTES
Seema Babin is a 53 year old female who is seen in consultation at the request of Becki Dunlap  for right knee pain.     Past Medical History:   Diagnosis Date     Hypertension goal BP (blood pressure) < 140/90        Past Surgical History:   Procedure Laterality Date      SECTION       HC TOOTH EXTRACTION W/FORCEP       TUBAL LIGATION         Family History   Problem Relation Age of Onset     Diabetes Mother      Diabetes Sister      Diabetes Brother      Diabetes Maternal Grandmother      Breast Cancer No family hx of      Colon Cancer No family hx of      Prostate Cancer No family hx of        Social History     Socioeconomic History     Marital status: Single     Spouse name: Not on file     Number of children: Not on file     Years of education: Not on file     Highest education level: Not on file   Social Needs     Financial resource strain: Not on file     Food insecurity - worry: Not on file     Food insecurity - inability: Not on file     Transportation needs - medical: Not on file     Transportation needs - non-medical: Not on file   Occupational History     Not on file   Tobacco Use     Smoking status: Never Smoker     Smokeless tobacco: Never Used   Substance and Sexual Activity     Alcohol use: Yes     Comment: wine now and then     Drug use: No     Sexual activity: Not on file   Other Topics Concern     Parent/sibling w/ CABG, MI or angioplasty before 65F 55M? Not Asked   Social History Narrative     Not on file       Current Outpatient Medications   Medication Sig Dispense Refill     cetirizine (ZYRTEC) 10 MG tablet Take 1 tablet (10 mg) by mouth daily 30 tablet      cloNIDine (CATAPRES) 0.1 MG tablet Take 1 tablet (0.1 mg) by mouth once for 1 dose 1 tablet 0     hydrochlorothiazide (HYDRODIURIL) 12.5 MG tablet Take 1 tablet (12.5 mg) by mouth daily 30 tablet 0     levothyroxine (SYNTHROID/LEVOTHROID) 25 MCG tablet Take 1 tablet (25 mcg) by mouth daily appt required for  "addt'l refills 90 tablet 0     order for DME Equipment being ordered: Class 2 knee high Jobst compression stockings 1 each 99     traMADol (ULTRAM) 50 MG tablet Take 1 tablet (50 mg) by mouth every 6 hours as needed for severe pain 15 tablet 0       Allergies   Allergen Reactions     Sulfa Drugs      Vicodin [Hydrocodone-Acetaminophen]        REVIEW OF SYSTEMS:  CONSTITUTIONAL:  NEGATIVE for fever, chills, change in weight, not feeling tired  SKIN:  NEGATIVE for worrisome rashes, no skin lumps, no skin ulcers and no non-healing wounds  EYES:  NEGATIVE for vision changes or irritation.  ENT/MOUTH:  NEGATIVE.  No hearing loss, no hoarseness, no difficulty swallowing.  RESP:  NEGATIVE. No cough or shortness of breath.  CV:  NEGATIVE for chest pain, palpitations or peripheral edema  GI:  NEGATIVE for nausea, abdominal pain, heartburn, or change in bowel habits  :  Negative. No dysuria, no hematuria  MUSCULOSKELETAL:  See HPI above  NEURO:  NEGATIVE . No headaches, no dizziness,  no numbness  ENDOCRINE:  NEGATIVE for temperature intolerance, skin/hair changes  HEME/ALLERGY/IMMUNE:  NEGATIVE for bleeding problems  PSYCHIATRIC:  NEGATIVE. no anxiety, no depression.      Exam:  Vitals: BP (!) 162/93 (BP Location: Left arm)   Resp 14   Ht 1.6 m (5' 3\")   Wt 120.2 kg (265 lb)   BMI 46.94 kg/m    BMI= Body mass index is 46.94 kg/m .  Constitutional:  healthy, alert and no distress  Neuro: Alert and Oriented x 3, Sensation grossly WNL.  HEENT:  Atraumatic, EOMI  Neck:  Neck supple with no tenderness.  Psych: Affect normal   Respiratory: Breathing not labored.  Cardiovascular: normal peripheral pulses  Lymph: no adenopathy  Skin: No rashes,worrisome lesions or skin problems  Spine: straight, no straight leg raising pain.  Hips show full range of motion.  There is no tenderness over the sacro-iliac joints, sciatic notch, or greater trochanters.     "

## 2018-12-28 NOTE — PROGRESS NOTES
Visit Date:   12/27/2018      SUBJECTIVE:  Ms. Tinoco is a 53-year-old female seen in consultation at the request of Becki Dunlap for evaluation of right knee pain.  She has had knee pain for years but got more severe in October 2018.  She does not recall history of injury but just walking seems to hurt it.  She has pain primarily anteriorly in the knee, but fair amount anterior medial.  She has shooting, constant pain rated 8 out of 10.  Rest has helped.  She did take naproxen but caused GI distress and she stopped this.  She has had  X-rays of both knees showing mild medial joint narrowing on both knees with mild spurring present on the medial aspect of the femur and the patella.      OBJECTIVE:   Examination shows knees with range of motion 0-115 degrees limited by bulk.  She has pain with full flexion of the right knee.  She has tenderness along the medial joint and especially anteromedial joint.  Mild tenderness at the anteromedial joint of the left knee also.  She had some medial pain with valgus stress, but has no instability.  She had no pain with varus stress.  With medial and lateral Ailyn, both produced pain at the anteromedial aspect of the knee.  She does have patellofemoral crepitation of both knees with especially terminal extension and has pain with this.  She has positive patellar grind.  There is no effusion.  Sensation and circulation are intact.      IMPRESSION:  Appears to be early osteoarthritis, mainly patellofemoral pain.        PLAN:  We discussed options, and I have recommended steroid injection.  She agrees with this and this was performed with 80 mg Depo-Medrol and lidocaine through an anteromedial approach.  I have also recommended weight loss, consideration of glucosamine and chondroitin sulfate, and strengthening of her quads and hip abduction.  We will see back ptalisha GALLAGHER MD             D: 12/28/2018   T: 12/28/2018   MT: [id number]      Name:     MERLIN TINOCO    MRN:      [id number]        Account:      [id number]   :      1965           Visit Date:   2018      Document: [id number]

## 2019-01-16 NOTE — PROGRESS NOTES
SUBJECTIVE:   Seema Babin is a 53 year old female who presents to clinic today for the following health issues:    Chief Complaint   Patient presents with     Shave Biopsy     Hypertension       Hypertension Follow-up      Outpatient blood pressures are not being checked.    Low Salt Diet: no added salt      Amount of exercise or physical activity: walking to and from the bus every day    Problems taking medications regularly: No    Medication side effects: none    Diet: regular (no restrictions)        Hypothyroidism Follow-up      Since last visit, patient describes the following symptoms: Has lost weight since starting synthroid, no hair loss, no skin changes, no constipation, no loose stools    Patient was seen with ortho for cortisone injection of right knee and pain persists. Tramadol did not helpful, Tylenol not helpful, Ibuprofen causes stomach irritation, Physical Therapy did not help.    Also here for shave biopsy of mole on chest, which was changing, then scratched off.    Problem list and histories reviewed & adjusted, as indicated.  Additional history: as documented    Patient Active Problem List   Diagnosis     Hypertension goal BP (blood pressure) < 140/90     Morbid obesity (H)     Hypothyroidism, unspecified type     Primary osteoarthritis of right knee     Past Surgical History:   Procedure Laterality Date      SECTION        TOOTH EXTRACTION W/FORCEP       TUBAL LIGATION         Social History     Tobacco Use     Smoking status: Never Smoker     Smokeless tobacco: Never Used   Substance Use Topics     Alcohol use: Yes     Comment: wine now and then     Family History   Problem Relation Age of Onset     Diabetes Mother      Diabetes Sister      Diabetes Brother      Diabetes Maternal Grandmother      Breast Cancer No family hx of      Colon Cancer No family hx of      Prostate Cancer No family hx of            Reviewed and updated as needed this visit by clinical  "staff       Reviewed and updated as needed this visit by Provider         ROS:  Constitutional, HEENT, cardiovascular, pulmonary, GI, , musculoskeletal, neuro, skin, endocrine and psych systems are negative, except as otherwise noted.    OBJECTIVE:     BP (!) 138/100 (BP Location: Left arm, Patient Position: Chair, Cuff Size: Adult Large)   Pulse 111   Temp 99.6  F (37.6  C) (Oral)   Ht 1.6 m (5' 3\")   Wt 113.9 kg (251 lb)   SpO2 95%   BMI 44.46 kg/m    Body mass index is 44.46 kg/m .  GENERAL: healthy, alert and no distress  RESP: lungs clear to auscultation - no rales, rhonchi or wheezes  CV: regular rate and rhythm, normal S1 S2, no S3 or S4, no murmur, click or rub, no peripheral edema and peripheral pulses strong  SKIN: 5 mm brown ulceration with irregular surface left upper chest  NEURO: Normal strength and tone, mentation intact and speech normal  PSYCH: mentation appears normal, affect normal/bright    Diagnostic Test Results:  Results for orders placed or performed in visit on 01/18/19   Basic metabolic panel   Result Value Ref Range    Sodium 136 133 - 144 mmol/L    Potassium 3.2 (L) 3.4 - 5.3 mmol/L    Chloride 97 94 - 109 mmol/L    Carbon Dioxide 27 20 - 32 mmol/L    Anion Gap 12 3 - 14 mmol/L    Glucose 95 70 - 99 mg/dL    Urea Nitrogen 11 7 - 30 mg/dL    Creatinine 0.95 0.52 - 1.04 mg/dL    GFR Estimate 69 >60 mL/min/[1.73_m2]    GFR Estimate If Black 80 >60 mL/min/[1.73_m2]    Calcium 9.3 8.5 - 10.1 mg/dL   TSH with free T4 reflex   Result Value Ref Range    TSH 4.49 (H) 0.40 - 4.00 mU/L   T4 free   Result Value Ref Range    T4 Free 1.40 0.76 - 1.46 ng/dL       ASSESSMENT/PLAN:     1. Hypertension goal BP (blood pressure) < 140/90  Change from hydrochlorothiazide to Prinzide. Repeat labs/BP check in 2 weeks.   - lisinopril-hydrochlorothiazide (PRINZIDE/ZESTORETIC) 10-12.5 MG tablet; Take 1 tablet by mouth daily  Dispense: 90 tablet; Refill: 0  - Basic metabolic panel  - **Basic metabolic panel " FUTURE 14d; Future  - T4 free  - T4 free    2. Hypothyroidism, unspecified type  Labs today and consider furhter dose titration pending results.  - TSH with free T4 reflex  - **TSH with free T4 reflex FUTURE anytime; Future    3. Primary osteoarthritis of right knee  Start trial of Celebrex for knee pain- if no improvement then follow up with ortho or consider further Physical Therapy.   - celecoxib (CELEBREX) 100 MG capsule; Take 1 capsule (100 mg) by mouth 2 times daily  Dispense: 180 capsule; Refill: 1    4. Changing skin lesion  After explaining procedure of shave excision, including risk of bleeding and infection and benefits of lesion removal and tissue diagnosis, the patient agreed to proceed with the procedure. Area was anesthetized with 1% lidocaine with epinephrine, a total of 1 Lesions cleaned with betadine x 3. Lesion shaved off with Derma Blade. Pressure applied, then drysol for hemostasis. Area dressed with bandaid and bacitracin. Wound care given to the patient. Pt tolerated procedure well, ebl minimal, no complications.    - Surgical pathology exam    5. Screen for colon cancer  Patient advised to return FIT card      See Patient Instructions    ANA Camara [facility]

## 2019-01-18 ENCOUNTER — OFFICE VISIT (OUTPATIENT)
Dept: FAMILY MEDICINE | Facility: CLINIC | Age: 54
End: 2019-01-18
Payer: COMMERCIAL

## 2019-01-18 VITALS
BODY MASS INDEX: 44.47 KG/M2 | TEMPERATURE: 99.6 F | WEIGHT: 251 LBS | HEART RATE: 111 BPM | OXYGEN SATURATION: 95 % | SYSTOLIC BLOOD PRESSURE: 138 MMHG | DIASTOLIC BLOOD PRESSURE: 100 MMHG | HEIGHT: 63 IN

## 2019-01-18 DIAGNOSIS — L98.9 CHANGING SKIN LESION: ICD-10-CM

## 2019-01-18 DIAGNOSIS — E03.9 HYPOTHYROIDISM, UNSPECIFIED TYPE: ICD-10-CM

## 2019-01-18 DIAGNOSIS — M17.11 PRIMARY OSTEOARTHRITIS OF RIGHT KNEE: ICD-10-CM

## 2019-01-18 DIAGNOSIS — I10 HYPERTENSION GOAL BP (BLOOD PRESSURE) < 140/90: Primary | ICD-10-CM

## 2019-01-18 DIAGNOSIS — Z12.11 SCREEN FOR COLON CANCER: ICD-10-CM

## 2019-01-18 LAB
ANION GAP SERPL CALCULATED.3IONS-SCNC: 12 MMOL/L (ref 3–14)
BUN SERPL-MCNC: 11 MG/DL (ref 7–30)
CALCIUM SERPL-MCNC: 9.3 MG/DL (ref 8.5–10.1)
CHLORIDE SERPL-SCNC: 97 MMOL/L (ref 94–109)
CO2 SERPL-SCNC: 27 MMOL/L (ref 20–32)
CREAT SERPL-MCNC: 0.95 MG/DL (ref 0.52–1.04)
GFR SERPL CREATININE-BSD FRML MDRD: 69 ML/MIN/{1.73_M2}
GLUCOSE SERPL-MCNC: 95 MG/DL (ref 70–99)
POTASSIUM SERPL-SCNC: 3.2 MMOL/L (ref 3.4–5.3)
SODIUM SERPL-SCNC: 136 MMOL/L (ref 133–144)
T4 FREE SERPL-MCNC: 1.4 NG/DL (ref 0.76–1.46)
TSH SERPL DL<=0.005 MIU/L-ACNC: 4.49 MU/L (ref 0.4–4)

## 2019-01-18 PROCEDURE — 36415 COLL VENOUS BLD VENIPUNCTURE: CPT | Performed by: NURSE PRACTITIONER

## 2019-01-18 PROCEDURE — 11300 SHAVE SKIN LESION 0.5 CM/<: CPT | Performed by: NURSE PRACTITIONER

## 2019-01-18 PROCEDURE — 84443 ASSAY THYROID STIM HORMONE: CPT | Performed by: NURSE PRACTITIONER

## 2019-01-18 PROCEDURE — 84439 ASSAY OF FREE THYROXINE: CPT | Performed by: NURSE PRACTITIONER

## 2019-01-18 PROCEDURE — 80048 BASIC METABOLIC PNL TOTAL CA: CPT | Performed by: NURSE PRACTITIONER

## 2019-01-18 PROCEDURE — 88305 TISSUE EXAM BY PATHOLOGIST: CPT | Performed by: NURSE PRACTITIONER

## 2019-01-18 PROCEDURE — 99214 OFFICE O/P EST MOD 30 MIN: CPT | Mod: 25 | Performed by: NURSE PRACTITIONER

## 2019-01-18 RX ORDER — CELECOXIB 100 MG/1
100 CAPSULE ORAL 2 TIMES DAILY
Qty: 180 CAPSULE | Refills: 1 | Status: SHIPPED | OUTPATIENT
Start: 2019-01-18 | End: 2019-02-19

## 2019-01-18 RX ORDER — LISINOPRIL/HYDROCHLOROTHIAZIDE 10-12.5 MG
1 TABLET ORAL DAILY
Qty: 90 TABLET | Refills: 0 | Status: SHIPPED | OUTPATIENT
Start: 2019-01-18 | End: 2019-04-12

## 2019-01-18 ASSESSMENT — MIFFLIN-ST. JEOR: SCORE: 1717.66

## 2019-01-18 ASSESSMENT — PAIN SCALES - GENERAL: PAINLEVEL: EXTREME PAIN (8)

## 2019-01-18 NOTE — PATIENT INSTRUCTIONS
Skin Care instructions:  Keep area dry for 24 hours. After this, it's ok to get wet and soapy in the shower.  If it starts to develop a scab-- apply some vaseline to the area.  You can keep covered if it is draining.  Watch for signs of infection-- fever, redness, or thick yellow drainage-- call if these develop.  I will call you with your test results if anything is abnormal. You will get a letter in the mail (or a [employer] message) with normal results.

## 2019-01-21 RX ORDER — LEVOTHYROXINE SODIUM 50 UG/1
50 TABLET ORAL DAILY
Qty: 90 TABLET | Refills: 0 | Status: SHIPPED | OUTPATIENT
Start: 2019-01-21 | End: 2019-02-19

## 2019-01-21 NOTE — RESULT ENCOUNTER NOTE
Please call patient:    -Potassium level is decreased due to Hydrochlorothiazide.  ADVISE: Increase potassium sources in diet (bananas, potatoes, tomatoes, avocados). Let's recheck in 2-4 weeks as planned- if still low at that time, will add potassium supplement.  -TSH (thyroid stimulating hormone) is abnormal suggesting you are currently underreplaced.  ADVISE: changing your medication dose to 50 micrograms/day (a prescription has been sent to your pharmacy) and rechecking your TSH with a lab appointment in 4-6 weeks.    Call or send me a message with any questions!    Becki Dunlap, CNP

## 2019-01-22 LAB — COPATH REPORT: NORMAL

## 2019-01-23 DIAGNOSIS — I10 HYPERTENSION GOAL BP (BLOOD PRESSURE) < 140/90: ICD-10-CM

## 2019-01-23 RX ORDER — HYDROCHLOROTHIAZIDE 12.5 MG/1
TABLET ORAL
Qty: 30 TABLET | Refills: 0 | OUTPATIENT
Start: 2019-01-23

## 2019-01-23 NOTE — TELEPHONE ENCOUNTER
"Routing refill request to provider for review/approval because:  Labs out of range:  BP, K     Requested Prescriptions   Pending Prescriptions Disp Refills     hydrochlorothiazide (HYDRODIURIL) 12.5 MG tablet [Pharmacy Med Name: HYDROCHLOROTHIAZIDE 12.5 MG TB] 30 tablet 0     Sig: TAKE 1 TABLET BY MOUTH EVERY DAY    Diuretics (Including Combos) Protocol Failed - 1/23/2019  7:52 AM       Failed - Blood pressure under 140/90 in past 12 months    BP Readings from Last 3 Encounters:   01/18/19 (!) 138/100   12/27/18 (!) 162/93   11/30/18 (!) 146/92                Failed - Normal serum potassium on file in past 12 months    Recent Labs   Lab Test 01/18/19  1421   POTASSIUM 3.2*                   Passed - Recent (12 mo) or future (30 days) visit within the authorizing provider's specialty    Patient had office visit in the last 12 months or has a visit in the next 30 days with authorizing provider or within the authorizing provider's specialty.  See \"Patient Info\" tab in inbasket, or \"Choose Columns\" in Meds & Orders section of the refill encounter.             Passed - Medication is active on med list       Passed - Patient is age 18 or older       Passed - No active pregancy on record       Passed - Normal serum creatinine on file in past 12 months    Recent Labs   Lab Test 01/18/19  1421   CR 0.95             Passed - Normal serum sodium on file in past 12 months    Recent Labs   Lab Test 01/18/19  1421                Passed - No positive pregnancy test in past 12 months        Gemini De Paz RN  "

## 2019-01-23 NOTE — TELEPHONE ENCOUNTER
At last appt hydrochlorothiazide was discontinued and Prinzide started instead.  Becki Dunlap, CNP

## 2019-02-19 ENCOUNTER — ALLIED HEALTH/NURSE VISIT (OUTPATIENT)
Dept: NURSING | Facility: CLINIC | Age: 54
End: 2019-02-19
Payer: COMMERCIAL

## 2019-02-19 ENCOUNTER — TELEPHONE (OUTPATIENT)
Dept: FAMILY MEDICINE | Facility: CLINIC | Age: 54
End: 2019-02-19

## 2019-02-19 VITALS — SYSTOLIC BLOOD PRESSURE: 108 MMHG | DIASTOLIC BLOOD PRESSURE: 80 MMHG

## 2019-02-19 DIAGNOSIS — M17.11 PRIMARY OSTEOARTHRITIS OF RIGHT KNEE: ICD-10-CM

## 2019-02-19 DIAGNOSIS — E03.9 HYPOTHYROIDISM, UNSPECIFIED TYPE: ICD-10-CM

## 2019-02-19 DIAGNOSIS — I10 HYPERTENSION GOAL BP (BLOOD PRESSURE) < 140/90: ICD-10-CM

## 2019-02-19 DIAGNOSIS — I10 HYPERTENSION GOAL BP (BLOOD PRESSURE) < 140/90: Primary | ICD-10-CM

## 2019-02-19 LAB
ANION GAP SERPL CALCULATED.3IONS-SCNC: 7 MMOL/L (ref 3–14)
BUN SERPL-MCNC: 10 MG/DL (ref 7–30)
CALCIUM SERPL-MCNC: 8.9 MG/DL (ref 8.5–10.1)
CHLORIDE SERPL-SCNC: 106 MMOL/L (ref 94–109)
CO2 SERPL-SCNC: 28 MMOL/L (ref 20–32)
CREAT SERPL-MCNC: 0.86 MG/DL (ref 0.52–1.04)
GFR SERPL CREATININE-BSD FRML MDRD: 77 ML/MIN/{1.73_M2}
GLUCOSE SERPL-MCNC: 91 MG/DL (ref 70–99)
POTASSIUM SERPL-SCNC: 4.5 MMOL/L (ref 3.4–5.3)
SODIUM SERPL-SCNC: 141 MMOL/L (ref 133–144)
T4 FREE SERPL-MCNC: 1.25 NG/DL (ref 0.76–1.46)
TSH SERPL DL<=0.005 MIU/L-ACNC: 6.32 MU/L (ref 0.4–4)

## 2019-02-19 PROCEDURE — 80048 BASIC METABOLIC PNL TOTAL CA: CPT | Performed by: NURSE PRACTITIONER

## 2019-02-19 PROCEDURE — 36415 COLL VENOUS BLD VENIPUNCTURE: CPT | Performed by: NURSE PRACTITIONER

## 2019-02-19 PROCEDURE — 84443 ASSAY THYROID STIM HORMONE: CPT | Performed by: NURSE PRACTITIONER

## 2019-02-19 PROCEDURE — 84439 ASSAY OF FREE THYROXINE: CPT | Performed by: NURSE PRACTITIONER

## 2019-02-19 RX ORDER — LEVOTHYROXINE SODIUM 75 UG/1
75 TABLET ORAL DAILY
Qty: 90 TABLET | Refills: 0 | Status: SHIPPED | OUTPATIENT
Start: 2019-02-19 | End: 2019-04-25

## 2019-02-19 NOTE — TELEPHONE ENCOUNTER
Patient came into ancillary appointment for a blood pressure check, and told me that she is now taking 2 pills two times a day for her Celebrex instead of 1 pill two times a day. Patient stated this is helping her a lot more than taking one pill two times a day. Please advise she may need a refill if this is ok for patient to be doing.     Nancy BO CMA ([facility])

## 2019-02-19 NOTE — TELEPHONE ENCOUNTER
"Left message for patient to call RN hotline [phone]. (also a result note if patient does call back)    Per Becki Dunlap:  \"Ok for Celebrex 200 mg bid for 1 month then try to wean back to 100 mg bid. Can send 30 day supply at higher dose.   Becki (Routing comment)\"    Gemini De Paz RN  "

## 2019-02-19 NOTE — PROGRESS NOTES
Seema Babin is a 53 year old patient who comes in today for a Blood Pressure check.  Initial BP:  /80      Data Unavailable  Disposition: results routed to provider    Patient is taking new medication for blood pressure, and not having any side effects from this and has not been taking her blood pressure at home.       Nancy BO CMA ([facility])

## 2019-02-20 RX ORDER — CELECOXIB 200 MG/1
200 CAPSULE ORAL 2 TIMES DAILY
Qty: 60 CAPSULE | Refills: 0 | Status: SHIPPED | OUTPATIENT
Start: 2019-02-20 | End: 2019-04-25

## 2019-02-20 NOTE — TELEPHONE ENCOUNTER
Left 2nd message for patient to call RN hotline [phone].   Advised pt MyChart message also sent.    Gemini De Paz RN

## 2019-03-15 DIAGNOSIS — M17.11 PRIMARY OSTEOARTHRITIS OF RIGHT KNEE: ICD-10-CM

## 2019-03-15 NOTE — TELEPHONE ENCOUNTER
Requested Prescriptions   Pending Prescriptions Disp Refills     celecoxib (CELEBREX) 200 MG capsule  Last Written Prescription Date:  2/20/19  Last Fill Quantity: 60,  # refills: 0   Last office visit: 1/18/2019 with prescribing provider:  Germán   Future Office Visit:     60 capsule 0     Sig: Take 1 capsule (200 mg) by mouth 2 times daily    There is no refill protocol information for this order

## 2019-03-18 RX ORDER — CELECOXIB 200 MG/1
200 CAPSULE ORAL 2 TIMES DAILY
Qty: 60 CAPSULE | Refills: 0 | Status: CANCELLED | OUTPATIENT
Start: 2019-03-18

## 2019-03-18 NOTE — TELEPHONE ENCOUNTER
Called patient and left VM to call clinic to schedule med check appointment. Please help schedule appointment if patient returns call.  Poppy VILLARREAL CMA ([facility])

## 2019-03-18 NOTE — TELEPHONE ENCOUNTER
"Routing refill request to provider for review/approval because:  Labs not current:        Requested Prescriptions   Pending Prescriptions Disp Refills     celecoxib (CELEBREX) 200 MG capsule  Last Written Prescription Date:  2/20/19  Last Fill Quantity: 60,  # refills: 0   Last office visit: 1/18/2019 with prescribing provider:     Future Office Visit:     60 capsule 0     Sig: Take 1 capsule (200 mg) by mouth 2 times daily    NSAID Medications Failed - 3/15/2019  4:42 PM       Failed - Normal ALT on file in past 12 months    No lab results found.         Failed - Normal AST on file in past 12 months    No lab results found.         Passed - Blood pressure under 140/90 in past 12 months    BP Readings from Last 3 Encounters:   02/19/19 108/80   01/18/19 (!) 138/100   12/27/18 (!) 162/93                Passed - Recent (12 mo) or future (30 days) visit within the authorizing provider's specialty    Patient had office visit in the last 12 months or has a visit in the next 30 days with authorizing provider or within the authorizing provider's specialty.  See \"Patient Info\" tab in inbasket, or \"Choose Columns\" in Meds & Orders section of the refill encounter.             Passed - Patient is age 6-64 years       Passed - Normal CBC on file in past 12 months    Recent Labs   Lab Test 11/30/18  1344   WBC 7.7   RBC 4.37   HGB 14.2   HCT 42.8                   Passed - Medication is active on med list       Passed - No active pregnancy on record       Passed - Normal serum creatinine on file in past 12 months    Recent Labs   Lab Test 02/19/19  0804   CR 0.86            Passed - No positive pregnancy test in past 12 months        Irina Veloz, RN - [facility]      "

## 2019-03-20 NOTE — TELEPHONE ENCOUNTER
patient called back. Schedule for 4/25/2019. No further concerns. Closing encounter  Harley Brown CMA on 3/20/2019 at 6:43 PM

## 2019-03-20 NOTE — TELEPHONE ENCOUNTER
2nd attempt. Called patient and left VM to call clinic to schedule med check appointment. Please help schedule appointment if patient returns call.  Harley Brown CMA on 3/20/2019 at 5:52 PM

## 2019-03-27 PROBLEM — M17.11 PRIMARY OSTEOARTHRITIS OF RIGHT KNEE: Status: RESOLVED | Noted: 2018-12-07 | Resolved: 2019-03-27

## 2019-03-27 NOTE — PROGRESS NOTES
Subjective:  HPI                    Objective:  System    Physical Exam    General     ROS    Assessment/Plan:    DISCHARGE REPORT    Progress reporting period is from 12.7.18 to 3.27.19.     SUBJECTIVE  Subjective: alittle better, less achilles pain and less muscle pain in Rt LE    Current Pain level: 7/10       Changes in function: No changes noted in function since last SOAP note   Adverse reactions: None;   ,     Patient has failed to return to therapy so current objective findings are unknown.    OBJECTIVE  Objective: +++TTP med Rt knee       ASSESSMENT/PLAN  Updated problem list and treatment plan: Diagnosis 1:  Knee pain     STG/LTGs have been met or progress has been made towards goals:  None  Assessment of Progress: Patient has not returned to therapy.  Current status is unknown and discharge G code cannot be reported.  Self Management Plans:  Patient has been instructed in a home treatment program.  Patient  has been instructed in self management of symptoms.    Seema continues to require the following intervention to meet STG and LTG's:   The patient failed to complete scheduled/ordered appointments so current information is unknown.  We will discharge this patient from PT.    Recommendations:      Please refer to the daily flowsheet for treatment today, total treatment time and time spent performing 1:1 timed codes.

## 2019-04-12 DIAGNOSIS — I10 HYPERTENSION GOAL BP (BLOOD PRESSURE) < 140/90: ICD-10-CM

## 2019-04-12 NOTE — PROGRESS NOTES
SUBJECTIVE:   Seema Babin is a 53 year old female who presents to clinic today for the following   health issues:      Hypertension Follow-up      Outpatient blood pressures are not being checked.    Low Salt Diet: no added salt    Hypothyroidism Follow-up      Since last visit, patient describes the following symptoms: Weight stable, no hair loss, no skin changes, no constipation, no loose stools      Amount of exercise or physical activity: 6-7 days/week for an average of 2,000 steps per day    Problems taking medications regularly: No    Medication side effects: gagging from the Celebrex    Diet: regular (no restrictions)    Patient reports coughing/gagging side effects from Celebrex, so she stopped the med 1 month ago and side effects are improving. Denies melena, vomiting, abdominal pain. Knee continues to be painful- unsure if Cortisone helped previously. Wonders about medical marijuana?      Additional history: as documented    Reviewed  and updated as needed this visit by clinical staff  Allergies  Meds         Reviewed and updated as needed this visit by Provider         Patient Active Problem List   Diagnosis     Hypertension goal BP (blood pressure) < 140/90     Morbid obesity (H)     Hypothyroidism, unspecified type     Primary osteoarthritis of right knee     Past Surgical History:   Procedure Laterality Date      SECTION        TOOTH EXTRACTION W/FORCEP       TUBAL LIGATION         Social History     Tobacco Use     Smoking status: Never Smoker     Smokeless tobacco: Never Used   Substance Use Topics     Alcohol use: Yes     Comment: wine now and then     Family History   Problem Relation Age of Onset     Diabetes Mother      Diabetes Sister      Diabetes Brother      Diabetes Maternal Grandmother      Breast Cancer No family hx of      Colon Cancer No family hx of      Prostate Cancer No family hx of            ROS:  Constitutional, HEENT, cardiovascular, pulmonary, GI,  ", musculoskeletal, neuro, skin, endocrine and psych systems are negative, except as otherwise noted.    OBJECTIVE:     /86 (BP Location: Left arm, Patient Position: Chair, Cuff Size: Adult Large)   Pulse 85   Temp 97.9  F (36.6  C) (Oral)   Ht 1.6 m (5' 3\")   Wt 110.2 kg (243 lb)   SpO2 97%   BMI 43.05 kg/m    Body mass index is 43.05 kg/m .  GENERAL: healthy, alert and no distress  RESP: lungs clear to auscultation - no rales, rhonchi or wheezes  CV: regular rate and rhythm, normal S1 S2, no S3 or S4, no murmur, click or rub, no peripheral edema and peripheral pulses strong    Diagnostic Test Results:  Results for orders placed or performed in visit on 02/19/19   **TSH with free T4 reflex FUTURE anytime   Result Value Ref Range    TSH 6.32 (H) 0.40 - 4.00 mU/L   **Basic metabolic panel FUTURE 14d   Result Value Ref Range    Sodium 141 133 - 144 mmol/L    Potassium 4.5 3.4 - 5.3 mmol/L    Chloride 106 94 - 109 mmol/L    Carbon Dioxide 28 20 - 32 mmol/L    Anion Gap 7 3 - 14 mmol/L    Glucose 91 70 - 99 mg/dL    Urea Nitrogen 10 7 - 30 mg/dL    Creatinine 0.86 0.52 - 1.04 mg/dL    GFR Estimate 77 >60 mL/min/[1.73_m2]    GFR Estimate If Black 90 >60 mL/min/[1.73_m2]    Calcium 8.9 8.5 - 10.1 mg/dL   T4 free   Result Value Ref Range    T4 Free 1.25 0.76 - 1.46 ng/dL       ASSESSMENT/PLAN:     1. Hypertension goal BP (blood pressure) < 140/90  Controlled, continue current medications without change.    2. Hypothyroidism, unspecified type  Labs today, consider further dose titration pending results  - **TSH with free T4 reflex FUTURE 2mo    3. Primary osteoarthritis of right knee  Recommend Physical Therapy but patient does not want to do this again- did not find it helpful. Encouraged continued weight loss efforts. Patient could try repeat steroid injection, Hyaluronic acid injections, possibly PRP? Referred to Sports Medicine to discuss options. Would not be candidate for medical cannabis unless she tried " and failed additional treatments.   - SPORTS MEDICINE REFERRAL    4. Gastroesophageal reflux disease with esophagitis  Due to Celebrex- stay off the med and may use Zantac two times daily to help alleviate symptoms.    5. Screen for colon cancer  Return FIT card      See Patient Instructions    ANA Camara [facility]

## 2019-04-13 DIAGNOSIS — E03.9 HYPOTHYROIDISM, UNSPECIFIED TYPE: ICD-10-CM

## 2019-04-15 RX ORDER — LEVOTHYROXINE SODIUM 50 UG/1
50 TABLET ORAL DAILY
Refills: 0 | COMMUNITY
Start: 2019-01-21 | End: 2019-04-15 | Stop reason: DRUGHIGH

## 2019-04-15 RX ORDER — LEVOTHYROXINE SODIUM 50 UG/1
TABLET ORAL
Qty: 90 TABLET | Refills: 0 | OUTPATIENT
Start: 2019-04-15

## 2019-04-15 RX ORDER — LISINOPRIL/HYDROCHLOROTHIAZIDE 10-12.5 MG
TABLET ORAL
Qty: 90 TABLET | Refills: 1 | Status: SHIPPED | OUTPATIENT
Start: 2019-04-15 | End: 2019-05-28 | Stop reason: ALTCHOICE

## 2019-04-15 NOTE — TELEPHONE ENCOUNTER
levothyroxine (SYNTHROID/LEVOTHROID) 50 MCG tablet        Last Written Prescription Date:  1/21/2019  Last Fill Quantity: Unknown,   # refills: 0  Last Office Visit: 1/18/2019  Future Office visit:    Next 5 appointments (look out 90 days)    Apr 25, 2019  8:00 AM CDT  Office Visit with ANA Camara CNP  [facility] ([facility]) [address]  [place] [address]  [phone]           Routing refill request to provider for review/approval because:  Medication is reported/historical

## 2019-04-25 ENCOUNTER — OFFICE VISIT (OUTPATIENT)
Dept: FAMILY MEDICINE | Facility: CLINIC | Age: 54
End: 2019-04-25
Payer: COMMERCIAL

## 2019-04-25 VITALS
OXYGEN SATURATION: 97 % | DIASTOLIC BLOOD PRESSURE: 86 MMHG | HEIGHT: 63 IN | SYSTOLIC BLOOD PRESSURE: 114 MMHG | BODY MASS INDEX: 43.05 KG/M2 | WEIGHT: 243 LBS | TEMPERATURE: 97.9 F | HEART RATE: 85 BPM

## 2019-04-25 DIAGNOSIS — I10 HYPERTENSION GOAL BP (BLOOD PRESSURE) < 140/90: Primary | ICD-10-CM

## 2019-04-25 DIAGNOSIS — Z12.11 SCREEN FOR COLON CANCER: ICD-10-CM

## 2019-04-25 DIAGNOSIS — K21.00 GASTROESOPHAGEAL REFLUX DISEASE WITH ESOPHAGITIS: ICD-10-CM

## 2019-04-25 DIAGNOSIS — M17.11 PRIMARY OSTEOARTHRITIS OF RIGHT KNEE: ICD-10-CM

## 2019-04-25 DIAGNOSIS — E03.9 HYPOTHYROIDISM, UNSPECIFIED TYPE: ICD-10-CM

## 2019-04-25 LAB — TSH SERPL DL<=0.005 MIU/L-ACNC: 2.76 MU/L (ref 0.4–4)

## 2019-04-25 PROCEDURE — 84443 ASSAY THYROID STIM HORMONE: CPT | Performed by: NURSE PRACTITIONER

## 2019-04-25 PROCEDURE — 36415 COLL VENOUS BLD VENIPUNCTURE: CPT | Performed by: NURSE PRACTITIONER

## 2019-04-25 PROCEDURE — 99214 OFFICE O/P EST MOD 30 MIN: CPT | Performed by: NURSE PRACTITIONER

## 2019-04-25 RX ORDER — LEVOTHYROXINE SODIUM 75 UG/1
75 TABLET ORAL DAILY
Qty: 90 TABLET | Refills: 3 | Status: SHIPPED | OUTPATIENT
Start: 2019-04-25 | End: 2020-05-15

## 2019-04-25 ASSESSMENT — PAIN SCALES - GENERAL: PAINLEVEL: EXTREME PAIN (8)

## 2019-04-25 ASSESSMENT — MIFFLIN-ST. JEOR: SCORE: 1681.37

## 2019-05-07 ENCOUNTER — OFFICE VISIT (OUTPATIENT)
Dept: ORTHOPEDICS | Facility: CLINIC | Age: 54
End: 2019-05-07
Payer: COMMERCIAL

## 2019-05-07 VITALS — BODY MASS INDEX: 43.05 KG/M2 | DIASTOLIC BLOOD PRESSURE: 82 MMHG | SYSTOLIC BLOOD PRESSURE: 114 MMHG | HEIGHT: 63 IN

## 2019-05-07 DIAGNOSIS — M17.0 BILATERAL PRIMARY OSTEOARTHRITIS OF KNEE: Primary | ICD-10-CM

## 2019-05-07 PROCEDURE — 20611 DRAIN/INJ JOINT/BURSA W/US: CPT | Mod: RT | Performed by: FAMILY MEDICINE

## 2019-05-07 PROCEDURE — 99213 OFFICE O/P EST LOW 20 MIN: CPT | Mod: 25 | Performed by: FAMILY MEDICINE

## 2019-05-07 NOTE — PROGRESS NOTES
ASSESSMENT & PLAN  There are no diagnoses linked to this encounter.  Patient is a 53 year old female presenting for evaluation of   Chief Complaint   Patient presents with     Right Knee - Pain     Left Knee - Pain   Bilateral Knee Pain: Ant knee pain below patella with XR showing mild OA.  Pain with patella grind and TTP under patella facets likely 2/2 PF OA.  Counseled pt on condition and tx options including therapy/steroid injections that were not helpful.  Given this pt elected to attempt Synvisc injection on the right side.  Plan to have her f/u in 2 weeks for contralateral side if tolerated.  Pt understands and agrees with plan  Treatment: HA injection on right  Physical Therapy Continue  Injection Synvisc on right, left possible in 2 weeks  Medications  Limited NSAIDs/Tylenol    Concerning signs/sx that would warrant urgent evaluation were discussed.  All questions were answered, patient understands and agrees with plan.      Return if symptoms worsen or fail to improve.    -----    SUBJECTIVE  Seema Babin is a/an 53 year old female who is seen in consultation at the request of  Becki Dunlap C.N.P. for evaluation of bilateral knee pain. The patient is seen by themselves.    Onset: 1 years(s) ago. Reports insidious onset without acute precipitating event.  Pt works admin, walks a lot.    Location of Pain: bilateral anterior knee   Rating of Pain at worst: 8/10  Rating of Pain Currently: 8/10  Worsened by: walking, stairs, walking on incline, sit to stand transition   Better with: nothing  Treatments tried: physical therapy, steroid injection-12/27/18 by Dr. Hardy with minimal relief, celebrex, glucosamine, Tumeric, CBD tablets, Naproxen   Associated symptoms: no distal numbness or tingling; denies swelling or warmth  Orthopedic history: None  Relevant surgical history: NO  Past Medical History:   Diagnosis Date     Hypertension goal BP (blood pressure) < 140/90      Social History  "    Socioeconomic History     Marital status: Single     Spouse name: Not on file     Number of children: Not on file     Years of education: Not on file     Highest education level: Not on file   Occupational History     Not on file   Social Needs     Financial resource strain: Not on file     Food insecurity:     Worry: Not on file     Inability: Not on file     Transportation needs:     Medical: Not on file     Non-medical: Not on file   Tobacco Use     Smoking status: Never Smoker     Smokeless tobacco: Never Used   Substance and Sexual Activity     Alcohol use: Yes     Comment: wine now and then     Drug use: No     Sexual activity: Not on file   Lifestyle     Physical activity:     Days per week: Not on file     Minutes per session: Not on file     Stress: Not on file   Relationships     Social connections:     Talks on phone: Not on file     Gets together: Not on file     Attends Anabaptism service: Not on file     Active member of club or organization: Not on file     Attends meetings of clubs or organizations: Not on file     Relationship status: Not on file     Intimate partner violence:     Fear of current or ex partner: Not on file     Emotionally abused: Not on file     Physically abused: Not on file     Forced sexual activity: Not on file   Other Topics Concern     Parent/sibling w/ CABG, MI or angioplasty before 65F 55M? Not Asked   Social History Narrative     Not on file         Patient's past medical, surgical, social, and family histories were reviewed today and no changes are noted.    REVIEW OF SYSTEMS:  10 point ROS is negative other than symptoms noted above in HPI, Past Medical History or as stated below  Constitutional: NEGATIVE for fever, chills, change in weight  Skin: NEGATIVE for worrisome rashes, moles or lesions  GI/: NEGATIVE for bowel or bladder changes  Neuro: NEGATIVE for weakness, dizziness or paresthesias    OBJECTIVE:  /82   Ht 1.6 m (5' 3\")   BMI 43.05 kg/m   "   General: healthy, alert and in no distress  HEENT: no scleral icterus or conjunctival erythema  Skin: no suspicious lesions or rash. No jaundice.  CV: no pedal edema  Resp: normal respiratory effort without conversational dyspnea   Psych: normal mood and affect  Gait: normal steady gait with appropriate coordination and balance  Neuro: Normal light sensory exam of lower extremity  MSK:  BILATERAL KNEE  Inspection:    normal alignment  Palpation:    Tender about the lateral patellar facet and medial patellar facet. Remainder of bony and ligamentous landmarks are nontender.    Mild effusion is present    Patellofemoral crepitus is Present  Range of Motion:     00 extension to 1350 flexion  Strength:    Quadriceps 5/5, hamstrings 5/5, gastrocsoleus 5/5 and tibialis anterior 5/5    Extensor mechanism intact  Special Tests:    Positive: Patellar grind, Ailyn's    Negative: MCL/valgus stress (0 & 30 deg), LCL/varus stress (0 & 30 deg), Lachman's    Independent visualization of the below image:  No results found for this or any previous visit (from the past 24 hour(s)).  XR KNEE BILATERAL 1/2 VW 10/30/2018 10:07 AM     COMPARISON: None.     HISTORY: Bilateral knee osteoarthritis.     FINDINGS:  RIGHT knee: Tricompartment osteophytosis with mild medial  compartmental joint space loss. No fractures are seen. Small amount of  fluid in the joint.     LEFT knee: No fractures are seen. Joint spaces are preserved. No knee  effusion.                                                                    IMPRESSION: Mild medial compartment predominant RIGHT knee  osteoarthritis. No acute bone abnormality identified in either knee.     ROSY ABDI MD    Large Joint Injection/Arthocentesis: R knee joint  Date/Time: 5/7/2019 9:05 AM  Performed by: Kerwin Quintanilla MD  Authorized by: Kerwin Quintanilla MD     Indications:  Pain and osteoarthritis  Needle Size:  18 G  Guidance: ultrasound    Approach:  Anterolateral  Location:   Knee      Medications:  48 mg hylan 48 MG/6ML  Aspirate amount (mL):  7  Aspirate:  Serous and yellow  Outcome:  Tolerated well, no immediate complications  Procedure discussed: discussed risks, benefits, and alternatives    Consent Given by:  Patient  Timeout: timeout called immediately prior to procedure    Prep: patient was prepped and draped in usual sterile fashion     Patient tolerated Synvisc injection on the right side.  Aftercare instructions given to patient.  Plan to follow-up as discussed above.     Kerwin Quintanilla MD [facility]          Patient's conditions were thoroughly discussed during today's visit with greater than 50% of the visit spent counseling the patient with total time spent face-to-face with the patient being 30 minutes.    Kerwin Quintanilla MD, [facility]

## 2019-05-07 NOTE — LETTER
5/7/2019         RE: Seema Babin  [address]  [place] [address]        Dear Colleague,    Thank you for referring your patient, Seema Babin, to the [facility] [place]. Please see a copy of my visit note below.    ASSESSMENT & PLAN  There are no diagnoses linked to this encounter.  Patient is a 53 year old female presenting for evaluation of   Chief Complaint   Patient presents with     Right Knee - Pain     Left Knee - Pain   Bilateral Knee Pain: Ant knee pain below patella with XR showing mild OA.  Pain with patella grind and TTP under patella facets likely 2/2 PF OA.  Counseled pt on condition and tx options including therapy/steroid injections that were not helpful.  Given this pt elected to attempt Synvisc injection on the right side.  Plan to have her f/u in 2 weeks for contralateral side if tolerated.  Pt understands and agrees with plan  Treatment: HA injection on right  Physical Therapy Continue  Injection Synvisc on right, left possible in 2 weeks  Medications  Limited NSAIDs/Tylenol    Concerning signs/sx that would warrant urgent evaluation were discussed.  All questions were answered, patient understands and agrees with plan.      Return if symptoms worsen or fail to improve.    -----    SUBJECTIVE  Seema Babin is a/an 53 year old female who is seen in consultation at the request of  Becki Dunlap C.N.P. for evaluation of bilateral knee pain. The patient is seen by themselves.    Onset: 1 years(s) ago. Reports insidious onset without acute precipitating event.  Pt works admin, walks a lot.    Location of Pain: bilateral anterior knee   Rating of Pain at worst: 8/10  Rating of Pain Currently: 8/10  Worsened by: walking, stairs, walking on incline, sit to stand transition   Better with: nothing  Treatments tried: physical therapy, steroid injection-12/27/18 by Dr. Hardy with minimal relief, celebrex, glucosamine, Tumeric, CBD tablets, Naproxen    Associated symptoms: no distal numbness or tingling; denies swelling or warmth  Orthopedic history: None  Relevant surgical history: NO  Past Medical History:   Diagnosis Date     Hypertension goal BP (blood pressure) < 140/90      Social History     Socioeconomic History     Marital status: Single     Spouse name: Not on file     Number of children: Not on file     Years of education: Not on file     Highest education level: Not on file   Occupational History     Not on file   Social Needs     Financial resource strain: Not on file     Food insecurity:     Worry: Not on file     Inability: Not on file     Transportation needs:     Medical: Not on file     Non-medical: Not on file   Tobacco Use     Smoking status: Never Smoker     Smokeless tobacco: Never Used   Substance and Sexual Activity     Alcohol use: Yes     Comment: wine now and then     Drug use: No     Sexual activity: Not on file   Lifestyle     Physical activity:     Days per week: Not on file     Minutes per session: Not on file     Stress: Not on file   Relationships     Social connections:     Talks on phone: Not on file     Gets together: Not on file     Attends Congregation service: Not on file     Active member of club or organization: Not on file     Attends meetings of clubs or organizations: Not on file     Relationship status: Not on file     Intimate partner violence:     Fear of current or ex partner: Not on file     Emotionally abused: Not on file     Physically abused: Not on file     Forced sexual activity: Not on file   Other Topics Concern     Parent/sibling w/ CABG, MI or angioplasty before 65F 55M? Not Asked   Social History Narrative     Not on file         Patient's past medical, surgical, social, and family histories were reviewed today and no changes are noted.    REVIEW OF SYSTEMS:  10 point ROS is negative other than symptoms noted above in HPI, Past Medical History or as stated below  Constitutional: NEGATIVE for fever, chills,  "change in weight  Skin: NEGATIVE for worrisome rashes, moles or lesions  GI/: NEGATIVE for bowel or bladder changes  Neuro: NEGATIVE for weakness, dizziness or paresthesias    OBJECTIVE:  /82   Ht 1.6 m (5' 3\")   BMI 43.05 kg/m      General: healthy, alert and in no distress  HEENT: no scleral icterus or conjunctival erythema  Skin: no suspicious lesions or rash. No jaundice.  CV: no pedal edema  Resp: normal respiratory effort without conversational dyspnea   Psych: normal mood and affect  Gait: normal steady gait with appropriate coordination and balance  Neuro: Normal light sensory exam of lower extremity  MSK:  BILATERAL KNEE  Inspection:    normal alignment  Palpation:    Tender about the lateral patellar facet and medial patellar facet. Remainder of bony and ligamentous landmarks are nontender.    Mild effusion is present    Patellofemoral crepitus is Present  Range of Motion:     00 extension to 1350 flexion  Strength:    Quadriceps 5/5, hamstrings 5/5, gastrocsoleus 5/5 and tibialis anterior 5/5    Extensor mechanism intact  Special Tests:    Positive: Patellar grind, Ailyn's    Negative: MCL/valgus stress (0 & 30 deg), LCL/varus stress (0 & 30 deg), Lachman's    Independent visualization of the below image:  No results found for this or any previous visit (from the past 24 hour(s)).  XR KNEE BILATERAL 1/2 VW 10/30/2018 10:07 AM     COMPARISON: None.     HISTORY: Bilateral knee osteoarthritis.     FINDINGS:  RIGHT knee: Tricompartment osteophytosis with mild medial  compartmental joint space loss. No fractures are seen. Small amount of  fluid in the joint.     LEFT knee: No fractures are seen. Joint spaces are preserved. No knee  effusion.                                                                    IMPRESSION: Mild medial compartment predominant RIGHT knee  osteoarthritis. No acute bone abnormality identified in either knee.     ROSY ABDI MD    Large Joint Injection/Arthocentesis: R " knee joint  Date/Time: 5/7/2019 9:05 AM  Performed by: Kerwin Quintanilla MD  Authorized by: Kerwin Quintanilla MD     Indications:  Pain and osteoarthritis  Needle Size:  18 G  Guidance: ultrasound    Approach:  Anterolateral  Location:  Knee      Medications:  48 mg hylan 48 MG/6ML  Aspirate amount (mL):  7  Aspirate:  Serous and yellow  Outcome:  Tolerated well, no immediate complications  Procedure discussed: discussed risks, benefits, and alternatives    Consent Given by:  Patient  Timeout: timeout called immediately prior to procedure    Prep: patient was prepped and draped in usual sterile fashion     Patient tolerated Synvisc injection on the right side.  Aftercare instructions given to patient.  Plan to follow-up as discussed above.     Kerwin Quintanilla MD [facility]          Patient's conditions were thoroughly discussed during today's visit with greater than 50% of the visit spent counseling the patient with total time spent face-to-face with the patient being 30 minutes.    Kerwin Quintanilla MD, [facility]      Again, thank you for allowing me to participate in the care of your patient.        Sincerely,        Kerwin Quintanilla MD

## 2019-05-07 NOTE — PATIENT INSTRUCTIONS
[facility] [place] Discharge Instructions      You may shower, however avoid swimming, tub baths or hot tubs for 24 hours following your procedure    You may have a mild to moderate increase in pain for several days following the injection.    It may take up to 14 days for the steroid medication to start working although you may feel the effect as early as a few days after the procedure.    You may use ice packs for 10-15 minutes, 3 to 4 times a day at the injection site for comfort    You may use anti-inflammatory medications (such as Ibuprofen or Aleve or Advil) or Tylenol for pain control if necessary    If you were fasting, you may resume your normal diet and medications after the procedure    If you have diabetes, check your blood sugar more frequently than usual as your blood sugar may be higher than normal for 10-14 days following a steroid injection. Contact your doctor who manages your diabetes if your blood sugar is higher than usual      If you experience any of the following, call [facility] [place] @ [phone] or [phone]  -Fever over 100 degree F  -Swelling, bleeding, redness, drainage, warmth at the injection site  - New or worsening pain

## 2019-06-18 ENCOUNTER — OFFICE VISIT (OUTPATIENT)
Dept: ORTHOPEDICS | Facility: CLINIC | Age: 54
End: 2019-06-18
Payer: COMMERCIAL

## 2019-06-18 VITALS — BODY MASS INDEX: 43.05 KG/M2 | SYSTOLIC BLOOD PRESSURE: 127 MMHG | DIASTOLIC BLOOD PRESSURE: 85 MMHG | HEIGHT: 63 IN

## 2019-06-18 DIAGNOSIS — M25.562 CHRONIC PAIN OF LEFT KNEE: Primary | ICD-10-CM

## 2019-06-18 DIAGNOSIS — G89.29 CHRONIC PAIN OF LEFT KNEE: Primary | ICD-10-CM

## 2019-06-18 PROCEDURE — 20611 DRAIN/INJ JOINT/BURSA W/US: CPT | Mod: LT | Performed by: FAMILY MEDICINE

## 2019-06-18 NOTE — PATIENT INSTRUCTIONS
[facility] [place] Injection Discharge Instructions      You may shower, however avoid swimming, tub baths or hot tubs for 24 hours following your procedure    You may have a mild to moderate increase in pain for several days following the injection.    It may take up to 14 days for the steroid medication to start working although you may feel the effect as early as a few days after the procedure.    You may use ice packs for 10-15 minutes, 3 to 4 times a day at the injection site for comfort    You may use anti-inflammatory medications (such as Ibuprofen or Aleve or Advil) or Tylenol for pain control if necessary    If you were fasting, you may resume your normal diet and medications after the procedure      If you experience any of the following, call [facility] @ [phone] or [phone]  -Fever over 100 degree F  -Swelling, bleeding, redness, drainage, warmth at the injection site  - New or worsening pain

## 2019-06-18 NOTE — LETTER
6/18/2019         RE: Seema Babin  [address] [place] [address]        Dear Colleague,    Thank you for referring your patient, Seema Babin, to the [facility] [place]. Please see a copy of my visit note below.        Large Joint Injection/Arthocentesis: L knee joint  Date/Time: 6/18/2019 8:55 AM  Performed by: Kerwin Quintanilla MD  Authorized by: Kerwin Quintanilla MD     Indications:  Pain and osteoarthritis  Needle Size:  18 G  Guidance: ultrasound    Approach:  Anterolateral  Location:  Knee      Medications:  48 mg hylan 48 MG/6ML  Outcome:  Tolerated well, no immediate complications  Procedure discussed: discussed risks, benefits, and alternatives    Consent Given by:  Patient  Timeout: timeout called immediately prior to procedure    Prep: patient was prepped and draped in usual sterile fashion     Patient tolerated Synvisc injection.  Aftercare instructions given to patient.  Plan to follow-up as discussed above.     Kerwin Quintanilla MD [facility] [place]            Again, thank you for allowing me to participate in the care of your patient.        Sincerely,        Kerwin Quintanilla MD

## 2019-06-18 NOTE — PROGRESS NOTES
Large Joint Injection/Arthocentesis: L knee joint  Date/Time: 6/18/2019 8:55 AM  Performed by: Kerwin Quintanilla MD  Authorized by: Kerwin Quintanilla MD     Indications:  Pain and osteoarthritis  Needle Size:  18 G  Guidance: ultrasound    Approach:  Anterolateral  Location:  Knee      Medications:  48 mg hylan 48 MG/6ML  Outcome:  Tolerated well, no immediate complications  Procedure discussed: discussed risks, benefits, and alternatives    Consent Given by:  Patient  Timeout: timeout called immediately prior to procedure    Prep: patient was prepped and draped in usual sterile fashion     Patient tolerated Synvisc injection.  Aftercare instructions given to patient.  Plan to follow-up as discussed above.     Kerwin Quintanilla MD [facility]

## 2019-12-15 ENCOUNTER — HEALTH MAINTENANCE LETTER (OUTPATIENT)
Age: 54
End: 2019-12-15

## 2020-03-22 ENCOUNTER — HEALTH MAINTENANCE LETTER (OUTPATIENT)
Age: 55
End: 2020-03-22

## 2020-05-02 DIAGNOSIS — I10 HYPERTENSION GOAL BP (BLOOD PRESSURE) < 140/90: ICD-10-CM

## 2020-05-04 NOTE — TELEPHONE ENCOUNTER
Per refill workflow sending to pool to have pt called to schedule visit. Pt is overdue for visit.    Nyasia Forbes, PharmD  Medication Therapy Management Pharmacist

## 2020-05-05 NOTE — TELEPHONE ENCOUNTER
Called and left patient VM to schedule for a telephone med check  Harley Brown CMA on 5/5/2020 at 10:23 AM

## 2020-05-07 NOTE — TELEPHONE ENCOUNTER
2nd attempts: called and left message for patient to return call to clinic.    -please help patient schedule a telephone visit for bp check and then route back to  RN Refill pool to get clay refills.    Diana Brown, CMA

## 2020-05-08 NOTE — TELEPHONE ENCOUNTER
Routing refill request to provider for review/approval because:  Clay given multiple times and patient did not follow up, please advise  Patient needs to be seen because it has been more than 1 year since last office visit.  Please send or deny another clay refill then route to team to send letter      Frank Agosto RN

## 2020-05-11 RX ORDER — LOSARTAN POTASSIUM AND HYDROCHLOROTHIAZIDE 12.5; 5 MG/1; MG/1
TABLET ORAL
Qty: 30 TABLET | Refills: 0 | OUTPATIENT
Start: 2020-05-11

## 2020-05-14 NOTE — PROGRESS NOTES
"Seema Babin is a 54 year old female who is being evaluated via a billable telephone visit.      The patient has been notified of following:     \"This telephone visit will be conducted via a call between you and your physician/provider. We have found that certain health care needs can be provided without the need for a physical exam.  This service lets us provide the care you need with a short phone conversation.  If a prescription is necessary we can send it directly to your pharmacy.  If lab work is needed we can place an order for that and you can then stop by our lab to have the test done at a later time.    Telephone visits are billed at different rates depending on your insurance coverage. During this emergency period, for some insurers they may be billed the same as an in-person visit.  Please reach out to your insurance provider with any questions.    If during the course of the call the physician/provider feels a telephone visit is not appropriate, you will not be charged for this service.\"    Patient has given verbal consent for Telephone visit?  Yes    What phone number would you like to be contacted at? [phone]    How would you like to obtain your AVS? Rojelio Julian     Seema Babin is a 54 year old female who presents to clinic today for the following health issues:    HPI  Hypertension Follow-up      Do you check your blood pressure regularly outside of the clinic? No     Are you following a low salt diet? Yes    Are your blood pressures ever more than 140 on the top number (systolic) OR more   than 90 on the bottom number (diastolic), for example 140/90? Yes- sometimes       How many servings of fruits and vegetables do you eat daily?  2-3    On average, how many sweetened beverages do you drink each day (Examples: soda, juice, sweet tea, etc.  Do NOT count diet or artificially sweetened beverages)?   1    How many days per week do you exercise enough to make your heart beat " faster? 3 or less    How many minutes a day do you exercise enough to make your heart beat faster? 9 or less    How many days per week do you miss taking your medication? 0      Hypothyroidism Follow-up      Since last visit, patient describes the following symptoms: Weight stable, no hair loss, no skin changes, no constipation, no loose stools      Reviewed and updated as needed this visit by Provider         Review of Systems   Constitutional, HEENT, cardiovascular, pulmonary, gi and gu systems are negative, except as otherwise noted.       Objective   Reported vitals:  There were no vitals taken for this visit.   healthy, alert and no distress  PSYCH: Alert and oriented times 3; coherent speech, normal   rate and volume, able to articulate logical thoughts, able   to abstract reason, no tangential thoughts, no hallucinations   or delusions  Her affect is normal  RESP: No cough, no audible wheezing, able to talk in full sentences  Remainder of exam unable to be completed due to telephone visits    Diagnostic Test Results:  Labs reviewed in Epic        Assessment/Plan:  1. Hypertension goal BP (blood pressure) < 140/90  BPs have been stable- DME written for home cuff so patient can monitor. Follow-up for lab only appt.  - losartan-hydrochlorothiazide (HYZAAR) 50-12.5 MG tablet; Take 1 tablet by mouth daily  Dispense: 90 tablet; Refill: 1  - **Basic metabolic panel FUTURE anytime; Future  - Home Blood Pressure Monitor Order for DME - ONLY FOR DME    2. Hypothyroidism, unspecified type  Well controlled, follow up for lab only appt  - levothyroxine (SYNTHROID/LEVOTHROID) 75 MCG tablet; Take 1 tablet (75 mcg) by mouth daily  Dispense: 90 tablet; Refill: 1  - **TSH with free T4 reflex FUTURE anytime; Future    3. Encounter for screening mammogram for breast cancer    - *MA Screening Digital Bilateral; Future    4. CARDIOVASCULAR SCREENING; LDL GOAL LESS THAN 160    - Lipid panel reflex to direct LDL Fasting;  Future    5. Morbid obesity (H)  Encouraged healthy diet, regular exercise      Return in about 1 week (around 5/22/2020) for Fasting Lab Only Appointment.      Phone call duration:  14 minutes    ANA Camara CNP

## 2020-05-15 ENCOUNTER — VIRTUAL VISIT (OUTPATIENT)
Dept: FAMILY MEDICINE | Facility: CLINIC | Age: 55
End: 2020-05-15
Payer: COMMERCIAL

## 2020-05-15 DIAGNOSIS — E66.01 MORBID OBESITY (H): ICD-10-CM

## 2020-05-15 DIAGNOSIS — E03.9 HYPOTHYROIDISM, UNSPECIFIED TYPE: ICD-10-CM

## 2020-05-15 DIAGNOSIS — Z12.31 ENCOUNTER FOR SCREENING MAMMOGRAM FOR BREAST CANCER: ICD-10-CM

## 2020-05-15 DIAGNOSIS — Z13.6 CARDIOVASCULAR SCREENING; LDL GOAL LESS THAN 160: ICD-10-CM

## 2020-05-15 DIAGNOSIS — I10 HYPERTENSION GOAL BP (BLOOD PRESSURE) < 140/90: Primary | ICD-10-CM

## 2020-05-15 PROCEDURE — 99214 OFFICE O/P EST MOD 30 MIN: CPT | Mod: TEL | Performed by: NURSE PRACTITIONER

## 2020-05-15 RX ORDER — LOSARTAN POTASSIUM AND HYDROCHLOROTHIAZIDE 12.5; 5 MG/1; MG/1
1 TABLET ORAL DAILY
Qty: 90 TABLET | Refills: 1 | Status: SHIPPED | OUTPATIENT
Start: 2020-05-15 | End: 2020-12-16

## 2020-05-15 RX ORDER — LEVOTHYROXINE SODIUM 75 UG/1
75 TABLET ORAL DAILY
Qty: 90 TABLET | Refills: 1 | Status: SHIPPED | OUTPATIENT
Start: 2020-05-15 | End: 2020-12-16

## 2020-11-10 DIAGNOSIS — I10 HYPERTENSION GOAL BP (BLOOD PRESSURE) < 140/90: ICD-10-CM

## 2020-11-10 DIAGNOSIS — E03.9 HYPOTHYROIDISM, UNSPECIFIED TYPE: ICD-10-CM

## 2020-11-11 RX ORDER — LOSARTAN POTASSIUM AND HYDROCHLOROTHIAZIDE 12.5; 5 MG/1; MG/1
TABLET ORAL
Qty: 90 TABLET | Refills: 1 | OUTPATIENT
Start: 2020-11-11

## 2020-11-11 RX ORDER — LEVOTHYROXINE SODIUM 75 UG/1
TABLET ORAL
Qty: 90 TABLET | Refills: 1 | OUTPATIENT
Start: 2020-11-11

## 2020-11-11 NOTE — TELEPHONE ENCOUNTER
Left a message for Seema to call the clinic to schedule an appointment. Please help the patient schedule for in clinic appointment. Kym Monzon,     Refused:  losartan-hydrochlorothiazide (HYZAAR) 50-12.5 MG tablet & levothyroxine (SYNTHROID/LEVOTHROID) 75 MCG tablet

## 2020-11-11 NOTE — TELEPHONE ENCOUNTER
"Requested Prescriptions   Pending Prescriptions Disp Refills    losartan-hydrochlorothiazide (HYZAAR) 50-12.5 MG tablet [Pharmacy Med Name: LOSARTAN-HCTZ 50-12.5 MG TAB] 90 tablet 1     Sig: TAKE 1 TABLET BY MOUTH EVERY DAY       Angiotensin-II Receptors Failed - 11/10/2020 12:17 AM        Failed - Last blood pressure under 140/90 in past 12 months     BP Readings from Last 3 Encounters:   06/18/19 127/85   05/07/19 114/82   04/25/19 114/86                 Failed - Normal serum creatinine on file in past 12 months     Recent Labs   Lab Test 02/19/19  0804   CR 0.86       Ok to refill medication if creatinine is low          Failed - Normal serum potassium on file in past 12 months     Recent Labs   Lab Test 02/19/19  0804   POTASSIUM 4.5                    Passed - Recent (12 mo) or future (30 days) visit within the authorizing provider's specialty     Patient has had an office visit with the authorizing provider or a provider within the authorizing providers department within the previous 12 mos or has a future within next 30 days. See \"Patient Info\" tab in inbasket, or \"Choose Columns\" in Meds & Orders section of the refill encounter.              Passed - Medication is active on med list        Passed - Patient is age 18 or older        Passed - No active pregnancy on record        Passed - No positive pregnancy test in past 12 months       Diuretics (Including Combos) Protocol Failed - 11/10/2020 12:17 AM        Failed - Blood pressure under 140/90 in past 12 months     BP Readings from Last 3 Encounters:   06/18/19 127/85   05/07/19 114/82   04/25/19 114/86                 Failed - Normal serum creatinine on file in past 12 months     Recent Labs   Lab Test 02/19/19  0804   CR 0.86              Failed - Normal serum potassium on file in past 12 months     Recent Labs   Lab Test 02/19/19  0804   POTASSIUM 4.5                    Failed - Normal serum sodium on file in past 12 months     Recent Labs   Lab Test " "02/19/19  0804                 Passed - Recent (12 mo) or future (30 days) visit within the authorizing provider's specialty     Patient has had an office visit with the authorizing provider or a provider within the authorizing providers department within the previous 12 mos or has a future within next 30 days. See \"Patient Info\" tab in inbasket, or \"Choose Columns\" in Meds & Orders section of the refill encounter.              Passed - Medication is active on med list        Passed - Patient is age 18 or older        Passed - No active pregancy on record        Passed - No positive pregnancy test in past 12 months          levothyroxine (SYNTHROID/LEVOTHROID) 75 MCG tablet [Pharmacy Med Name: LEVOTHYROXINE 75 MCG TABLET] 90 tablet 1     Sig: TAKE 1 TABLET BY MOUTH EVERY DAY       Thyroid Protocol Failed - 11/10/2020 12:17 AM        Failed - Normal TSH on file in past 12 months     Recent Labs   Lab Test 04/25/19  0856   TSH 2.76              Passed - Patient is 12 years or older        Passed - Recent (12 mo) or future (30 days) visit within the authorizing provider's specialty     Patient has had an office visit with the authorizing provider or a provider within the authorizing providers department within the previous 12 mos or has a future within next 30 days. See \"Patient Info\" tab in inbasket, or \"Choose Columns\" in Meds & Orders section of the refill encounter.              Passed - Medication is active on med list        Passed - No active pregnancy on record     If patient is pregnant or has had a positive pregnancy test, please check TSH.          Passed - No positive pregnancy test in past 12 months     If patient is pregnant or has had a positive pregnancy test, please check TSH.               "

## 2020-11-23 NOTE — TELEPHONE ENCOUNTER
Attempt 2 called patient and left VM to call clinic. Please help schedule med check appointment if patient returns call.  Poppy VILLARREAL CMA ([facility])

## 2020-12-03 NOTE — PROGRESS NOTES
"Seema Babin is a 55 year old female who is being evaluated via a billable telephone visit.      The patient has been notified of following:     \"This telephone visit will be conducted via a call between you and your physician/provider. We have found that certain health care needs can be provided without the need for a physical exam.  This service lets us provide the care you need with a short phone conversation.  If a prescription is necessary we can send it directly to your pharmacy.  If lab work is needed we can place an order for that and you can then stop by our lab to have the test done at a later time.    Telephone visits are billed at different rates depending on your insurance coverage. During this emergency period, for some insurers they may be billed the same as an in-person visit.  Please reach out to your insurance provider with any questions.    If during the course of the call the physician/provider feels a telephone visit is not appropriate, you will not be charged for this service.\"    Patient has given verbal consent for Telephone visit?  Yes    What phone number would you like to be contacted at? [phone]    How would you like to obtain your AVS? Cordellt    Subjective     Seema Babin is a 55 year old female who presents via phone visit today for the following health issues:    HPI     Hypertension Follow-up      Do you check your blood pressure regularly outside of the clinic? Had been but machine had broken     Are you following a low salt diet? Yes    Are your blood pressures ever more than 140 on the top number (systolic) OR more   than 90 on the bottom number (diastolic), for example 140/90? Just slightly    BP was around 109/84 when checked at home twice.    Hypothyroidism Follow-up      Since last visit, patient describes the following symptoms: Weight stable, no hair loss, no skin changes, no constipation, no loose stools      How many servings of fruits and vegetables do you " eat daily?  2-3    On average, how many sweetened beverages do you drink each day (Examples: soda, juice, sweet tea, etc.  Do NOT count diet or artificially sweetened beverages)?   1    How many days per week do you exercise enough to make your heart beat faster? 7    How many minutes a day do you exercise enough to make your heart beat faster? 30 - 60  How many days per week do you miss taking your medication? 1    What makes it hard for you to take your medications?  remembering to take        Review of Systems   Constitutional, HEENT, cardiovascular, pulmonary, GI, , musculoskeletal, neuro, skin, endocrine and psych systems are negative, except as otherwise noted.       Objective          Vitals:  No vitals were obtained today due to virtual visit.    healthy, alert and no distress  PSYCH: Alert and oriented times 3; coherent speech, normal   rate and volume, able to articulate logical thoughts, able   to abstract reason, no tangential thoughts, no hallucinations   or delusions  Her affect is normal  RESP: No cough, no audible wheezing, able to talk in full sentences  Remainder of exam unable to be completed due to telephone visits    No results found for any visits on 12/16/20.        Assessment/Plan:    Assessment & Plan     Hypertension goal BP (blood pressure) < 140/90  Stable, medications refilled. Patient needs to return for lab appt.  - losartan-hydrochlorothiazide (HYZAAR) 50-12.5 MG tablet; Take 1 tablet by mouth daily    Hypothyroidism, unspecified type  Euthyroid but due for labs  - levothyroxine (SYNTHROID/LEVOTHROID) 75 MCG tablet; Take 1 tablet (75 mcg) by mouth daily    Screening for HIV (human immunodeficiency virus)    - HIV Antigen Antibody Combo; Future    Need for hepatitis C screening test    - **Hepatitis C Screen Reflex to RNA FUTURE anytime; Future    Encounter for screening mammogram for breast cancer    - *MA Screening Digital Bilateral; Future        See Patient Instructions    Return  in about 1 month (around 1/16/2021) for Fasting Lab Only Appointment, mammogram.    ANA Camara [facility] [place]    Phone call duration:  10 minutes

## 2020-12-16 ENCOUNTER — VIRTUAL VISIT (OUTPATIENT)
Dept: FAMILY MEDICINE | Facility: CLINIC | Age: 55
End: 2020-12-16
Payer: COMMERCIAL

## 2020-12-16 DIAGNOSIS — Z11.59 NEED FOR HEPATITIS C SCREENING TEST: ICD-10-CM

## 2020-12-16 DIAGNOSIS — E03.9 HYPOTHYROIDISM, UNSPECIFIED TYPE: ICD-10-CM

## 2020-12-16 DIAGNOSIS — I10 HYPERTENSION GOAL BP (BLOOD PRESSURE) < 140/90: Primary | ICD-10-CM

## 2020-12-16 DIAGNOSIS — Z11.4 SCREENING FOR HIV (HUMAN IMMUNODEFICIENCY VIRUS): ICD-10-CM

## 2020-12-16 DIAGNOSIS — Z12.31 ENCOUNTER FOR SCREENING MAMMOGRAM FOR BREAST CANCER: ICD-10-CM

## 2020-12-16 PROCEDURE — 99214 OFFICE O/P EST MOD 30 MIN: CPT | Mod: TEL | Performed by: NURSE PRACTITIONER

## 2020-12-16 RX ORDER — LOSARTAN POTASSIUM AND HYDROCHLOROTHIAZIDE 12.5; 5 MG/1; MG/1
1 TABLET ORAL DAILY
Qty: 90 TABLET | Refills: 0 | Status: SHIPPED | OUTPATIENT
Start: 2020-12-16 | End: 2021-03-17

## 2020-12-16 RX ORDER — LEVOTHYROXINE SODIUM 75 UG/1
75 TABLET ORAL DAILY
Qty: 90 TABLET | Refills: 0 | Status: SHIPPED | OUTPATIENT
Start: 2020-12-16 | End: 2021-03-17

## 2021-01-15 ENCOUNTER — HEALTH MAINTENANCE LETTER (OUTPATIENT)
Age: 56
End: 2021-01-15

## 2021-03-09 DIAGNOSIS — E03.9 HYPOTHYROIDISM, UNSPECIFIED TYPE: ICD-10-CM

## 2021-03-09 DIAGNOSIS — I10 HYPERTENSION GOAL BP (BLOOD PRESSURE) < 140/90: ICD-10-CM

## 2021-03-12 NOTE — TELEPHONE ENCOUNTER
TC-Pt needs fasting labs-please schedule appt.  Niya Sanders RN  Routing refill request to provider for review/approval because:  Labs not current:    TSH   Date Value Ref Range Status   04/25/2019 2.76 0.40 - 4.00 mU/L Final

## 2021-03-16 RX ORDER — LEVOTHYROXINE SODIUM 75 UG/1
TABLET ORAL
Qty: 90 TABLET | Refills: 0 | OUTPATIENT
Start: 2021-03-16

## 2021-03-16 RX ORDER — LOSARTAN POTASSIUM AND HYDROCHLOROTHIAZIDE 12.5; 5 MG/1; MG/1
TABLET ORAL
Qty: 90 TABLET | Refills: 0 | OUTPATIENT
Start: 2021-03-16

## 2021-03-17 RX ORDER — LEVOTHYROXINE SODIUM 75 UG/1
75 TABLET ORAL DAILY
Qty: 30 TABLET | Refills: 0 | Status: SHIPPED | OUTPATIENT
Start: 2021-03-17 | End: 2021-03-26 | Stop reason: DRUGHIGH

## 2021-03-17 RX ORDER — LOSARTAN POTASSIUM AND HYDROCHLOROTHIAZIDE 12.5; 5 MG/1; MG/1
1 TABLET ORAL DAILY
Qty: 30 TABLET | Refills: 0 | Status: SHIPPED | OUTPATIENT
Start: 2021-03-17 | End: 2021-06-10

## 2021-03-17 NOTE — TELEPHONE ENCOUNTER
Spoke to patient and scheduled appointment for 3/24/2021.  Poppy VILLARREAL CMA ([facility])

## 2021-03-24 DIAGNOSIS — Z11.59 NEED FOR HEPATITIS C SCREENING TEST: ICD-10-CM

## 2021-03-24 DIAGNOSIS — E03.9 HYPOTHYROIDISM, UNSPECIFIED TYPE: ICD-10-CM

## 2021-03-24 DIAGNOSIS — I10 HYPERTENSION GOAL BP (BLOOD PRESSURE) < 140/90: ICD-10-CM

## 2021-03-24 DIAGNOSIS — Z11.4 SCREENING FOR HIV (HUMAN IMMUNODEFICIENCY VIRUS): ICD-10-CM

## 2021-03-24 PROCEDURE — 36415 COLL VENOUS BLD VENIPUNCTURE: CPT | Performed by: NURSE PRACTITIONER

## 2021-03-24 PROCEDURE — 80048 BASIC METABOLIC PNL TOTAL CA: CPT | Performed by: NURSE PRACTITIONER

## 2021-03-24 PROCEDURE — 86803 HEPATITIS C AB TEST: CPT | Performed by: NURSE PRACTITIONER

## 2021-03-24 PROCEDURE — 84443 ASSAY THYROID STIM HORMONE: CPT | Performed by: NURSE PRACTITIONER

## 2021-03-24 PROCEDURE — 87389 HIV-1 AG W/HIV-1&-2 AB AG IA: CPT | Performed by: NURSE PRACTITIONER

## 2021-03-24 PROCEDURE — 84439 ASSAY OF FREE THYROXINE: CPT | Performed by: NURSE PRACTITIONER

## 2021-03-25 LAB
ANION GAP SERPL CALCULATED.3IONS-SCNC: 6 MMOL/L (ref 3–14)
BUN SERPL-MCNC: 11 MG/DL (ref 7–30)
CALCIUM SERPL-MCNC: 9 MG/DL (ref 8.5–10.1)
CHLORIDE SERPL-SCNC: 105 MMOL/L (ref 94–109)
CO2 SERPL-SCNC: 27 MMOL/L (ref 20–32)
CREAT SERPL-MCNC: 0.95 MG/DL (ref 0.52–1.04)
GFR SERPL CREATININE-BSD FRML MDRD: 68 ML/MIN/{1.73_M2}
GLUCOSE SERPL-MCNC: 85 MG/DL (ref 70–99)
HCV AB SERPL QL IA: NONREACTIVE
HIV 1+2 AB+HIV1 P24 AG SERPL QL IA: NONREACTIVE
POTASSIUM SERPL-SCNC: 4 MMOL/L (ref 3.4–5.3)
SODIUM SERPL-SCNC: 138 MMOL/L (ref 133–144)
T4 FREE SERPL-MCNC: 1.27 NG/DL (ref 0.76–1.46)
TSH SERPL DL<=0.005 MIU/L-ACNC: 8.91 MU/L (ref 0.4–4)

## 2021-03-26 DIAGNOSIS — E03.9 HYPOTHYROIDISM, UNSPECIFIED TYPE: Primary | ICD-10-CM

## 2021-03-26 RX ORDER — LEVOTHYROXINE SODIUM 88 UG/1
88 TABLET ORAL DAILY
Qty: 30 TABLET | Refills: 1 | Status: SHIPPED | OUTPATIENT
Start: 2021-03-26 | End: 2021-04-21

## 2021-05-15 ENCOUNTER — HEALTH MAINTENANCE LETTER (OUTPATIENT)
Age: 56
End: 2021-05-15

## 2021-06-04 NOTE — PROGRESS NOTES
Seema is a 55 year old who is being evaluated via a billable telephone visit.      What phone number would you like to be contacted at? [phone]  How would you like to obtain your AVS? MyChart    Assessment & Plan     Hypothyroidism, unspecified type  Uncontrolled, had recent dose adjustment and needs follow up labs. Explained importance of this to patient as well as risks of inadequately treated hypothyroidism. She will follow up for a physical within 1 month and do labs at that time.  - levothyroxine (SYNTHROID/LEVOTHROID) 88 MCG tablet; Take 1 tablet (88 mcg) by mouth daily    Hypertension goal BP (blood pressure) < 140/90  Likely uncontrolled as has been out of medications- refills sent and patient to follow up for physical/BP check.  - losartan-hydrochlorothiazide (HYZAAR) 50-12.5 MG tablet; Take 1 tablet by mouth daily    Screen for colon cancer    - YANETH([employer])    Review of the result(s) of each unique test - BMP, TSH, T4  Prescription drug management         See Patient Instructions    Return in about 1 month (around 7/10/2021) for Physical with Pap & Mammo.    ANA Camara [facility]    Subjective   Seema is a 55 year old who presents for the following health issues     HPI     Hypertension Follow-up      Do you check your blood pressure regularly outside of the clinic? No     Are you following a low salt diet? Yes    Are your blood pressures ever more than 140 on the top number (systolic) OR more   than 90 on the bottom number (diastolic), for example 140/90? unknown    Hypothyroidism Follow-up      Since last visit, patient describes the following symptoms: Weight stable, no hair loss, no skin changes, no constipation, no loose stools      How many servings of fruits and vegetables do you eat daily?  2-3    On average, how many sweetened beverages do you drink each day (Examples: soda, juice, sweet tea, etc.  Do NOT count diet or artificially  sweetened beverages)?   1    How many days per week do you exercise enough to make your heart beat faster? Just walking at work    How many minutes a day do you exercise enough to make your heart beat faster? As above  How many days per week do you miss taking your medication? 7    What makes it hard for you to take your medications?  has been out    Patient does not understand why we are doing this telephone visit today- says she just had labs done in March.    Review of Systems   Constitutional, HEENT, cardiovascular, pulmonary, gi and gu systems are negative, except as otherwise noted.      Objective           Vitals:  No vitals were obtained today due to virtual visit.    Physical Exam   healthy, alert and no distress  PSYCH: Alert and oriented times 3; coherent speech, normal   rate and volume, able to articulate logical thoughts, able   to abstract reason, no tangential thoughts, no hallucinations   or delusions  Her affect is normal  RESP: No cough, no audible wheezing, able to talk in full sentences  Remainder of exam unable to be completed due to telephone visits    No results found for this or any previous visit (from the past 24 hour(s)).            Phone call duration: 9 minutes

## 2021-06-10 ENCOUNTER — VIRTUAL VISIT (OUTPATIENT)
Dept: FAMILY MEDICINE | Facility: CLINIC | Age: 56
End: 2021-06-10
Payer: COMMERCIAL

## 2021-06-10 DIAGNOSIS — I10 HYPERTENSION GOAL BP (BLOOD PRESSURE) < 140/90: ICD-10-CM

## 2021-06-10 DIAGNOSIS — Z12.11 SCREEN FOR COLON CANCER: ICD-10-CM

## 2021-06-10 DIAGNOSIS — E03.9 HYPOTHYROIDISM, UNSPECIFIED TYPE: Primary | ICD-10-CM

## 2021-06-10 PROCEDURE — 99214 OFFICE O/P EST MOD 30 MIN: CPT | Mod: TEL | Performed by: NURSE PRACTITIONER

## 2021-06-10 RX ORDER — LOSARTAN POTASSIUM AND HYDROCHLOROTHIAZIDE 12.5; 5 MG/1; MG/1
1 TABLET ORAL DAILY
Qty: 90 TABLET | Refills: 0 | Status: SHIPPED | OUTPATIENT
Start: 2021-06-10 | End: 2021-07-09

## 2021-06-10 RX ORDER — LEVOTHYROXINE SODIUM 88 UG/1
88 TABLET ORAL DAILY
Qty: 30 TABLET | Refills: 1 | Status: SHIPPED | OUTPATIENT
Start: 2021-06-10 | End: 2021-07-09

## 2021-07-01 NOTE — PATIENT INSTRUCTIONS
Ask in the pharmacy about the Shingrix vaccine.    Pedro Lock Advanced Care Planning is a free service available through [facility]    Please visit [email or web address] or call [phone] to learn about and register for classes.    If you need an , please call your clinic to arrange for an individual appointment.    For other questions email [email or web address] or call [phone].      Preventive Health Recommendations  Female Ages 50 - 64    Yearly exam: See your health care provider every year in order to  o Review health changes.   o Discuss preventive care.    o Review your medicines if your doctor has prescribed any.      Get a Pap test every three years (unless you have an abnormal result and your provider advises testing more often).    If you get Pap tests with HPV test, you only need to test every 5 years, unless you have an abnormal result.     You do not need a Pap test if your uterus was removed (hysterectomy) and you have not had cancer.    You should be tested each year for STDs (sexually transmitted diseases) if you're at risk.     Have a mammogram every 1 to 2 years.    Have a colonoscopy at age 50, or have a yearly FIT test (stool test). These exams screen for colon cancer.      Have a cholesterol test every 5 years, or more often if advised.    Have a diabetes test (fasting glucose) every three years. If you are at risk for diabetes, you should have this test more often.     If you are at risk for osteoporosis (brittle bone disease), think about having a bone density scan (DEXA).    Shots: Get a flu shot each year. Get a tetanus shot every 10 years.    Nutrition:     Eat at least 5 servings of fruits and vegetables each day.    Eat whole-grain bread, whole-wheat pasta and brown rice instead of white grains and rice.    Get adequate Calcium and Vitamin D.     Lifestyle    Exercise at least 150 minutes a week (30 minutes a day, 5 days a week). This will help you  control your weight and prevent disease.    Limit alcohol to one drink per day.    No smoking.     Wear sunscreen to prevent skin cancer.     See your dentist every six months for an exam and cleaning.    See your eye doctor every 1 to 2 years.

## 2021-07-02 ASSESSMENT — ENCOUNTER SYMPTOMS
PARESTHESIAS: 0
NAUSEA: 0
BREAST MASS: 0
MYALGIAS: 0
FEVER: 0
WEAKNESS: 0
ARTHRALGIAS: 0
NERVOUS/ANXIOUS: 0
DYSURIA: 0
JOINT SWELLING: 0
CHILLS: 0
ABDOMINAL PAIN: 0
DIZZINESS: 0
HEMATURIA: 0
HEMATOCHEZIA: 0
HEARTBURN: 0
CONSTIPATION: 0
SHORTNESS OF BREATH: 0
HEADACHES: 0
PALPITATIONS: 0
FREQUENCY: 0
SORE THROAT: 0
DIARRHEA: 0
COUGH: 0
EYE PAIN: 0

## 2021-07-09 ENCOUNTER — OFFICE VISIT (OUTPATIENT)
Dept: FAMILY MEDICINE | Facility: CLINIC | Age: 56
End: 2021-07-09
Payer: COMMERCIAL

## 2021-07-09 ENCOUNTER — ANCILLARY PROCEDURE (OUTPATIENT)
Dept: MAMMOGRAPHY | Facility: CLINIC | Age: 56
End: 2021-07-09
Attending: NURSE PRACTITIONER
Payer: COMMERCIAL

## 2021-07-09 VITALS
DIASTOLIC BLOOD PRESSURE: 82 MMHG | HEART RATE: 72 BPM | HEIGHT: 63 IN | BODY MASS INDEX: 45.18 KG/M2 | WEIGHT: 255 LBS | TEMPERATURE: 98.6 F | SYSTOLIC BLOOD PRESSURE: 126 MMHG | OXYGEN SATURATION: 96 %

## 2021-07-09 DIAGNOSIS — Z00.00 ROUTINE GENERAL MEDICAL EXAMINATION AT A HEALTH CARE FACILITY: Primary | ICD-10-CM

## 2021-07-09 DIAGNOSIS — I10 HYPERTENSION GOAL BP (BLOOD PRESSURE) < 140/90: ICD-10-CM

## 2021-07-09 DIAGNOSIS — Z12.11 SCREEN FOR COLON CANCER: ICD-10-CM

## 2021-07-09 DIAGNOSIS — Z12.31 ENCOUNTER FOR SCREENING MAMMOGRAM FOR BREAST CANCER: ICD-10-CM

## 2021-07-09 DIAGNOSIS — E03.9 HYPOTHYROIDISM, UNSPECIFIED TYPE: ICD-10-CM

## 2021-07-09 DIAGNOSIS — L91.8 SKIN TAG: ICD-10-CM

## 2021-07-09 DIAGNOSIS — Z71.89 ADVANCED DIRECTIVES, COUNSELING/DISCUSSION: ICD-10-CM

## 2021-07-09 DIAGNOSIS — Z12.4 SCREENING FOR MALIGNANT NEOPLASM OF CERVIX: ICD-10-CM

## 2021-07-09 LAB
CHOLEST SERPL-MCNC: 228 MG/DL
HDLC SERPL-MCNC: 45 MG/DL
LDLC SERPL CALC-MCNC: 155 MG/DL
NONHDLC SERPL-MCNC: 183 MG/DL
T4 FREE SERPL-MCNC: 1.45 NG/DL (ref 0.76–1.46)
TRIGL SERPL-MCNC: 141 MG/DL
TSH SERPL DL<=0.005 MIU/L-ACNC: 6.23 MU/L (ref 0.4–4)

## 2021-07-09 PROCEDURE — 87624 HPV HI-RISK TYP POOLED RSLT: CPT | Performed by: NURSE PRACTITIONER

## 2021-07-09 PROCEDURE — G0124 SCREEN C/V THIN LAYER BY MD: HCPCS | Performed by: PATHOLOGY

## 2021-07-09 PROCEDURE — 80061 LIPID PANEL: CPT | Performed by: NURSE PRACTITIONER

## 2021-07-09 PROCEDURE — 77067 SCR MAMMO BI INCL CAD: CPT | Mod: TC | Performed by: RADIOLOGY

## 2021-07-09 PROCEDURE — 17111 DESTRUCTION B9 LESIONS 15/>: CPT | Performed by: NURSE PRACTITIONER

## 2021-07-09 PROCEDURE — 99396 PREV VISIT EST AGE 40-64: CPT | Mod: 25 | Performed by: NURSE PRACTITIONER

## 2021-07-09 PROCEDURE — 36415 COLL VENOUS BLD VENIPUNCTURE: CPT | Performed by: NURSE PRACTITIONER

## 2021-07-09 PROCEDURE — 84439 ASSAY OF FREE THYROXINE: CPT | Performed by: NURSE PRACTITIONER

## 2021-07-09 PROCEDURE — 84443 ASSAY THYROID STIM HORMONE: CPT | Performed by: NURSE PRACTITIONER

## 2021-07-09 PROCEDURE — G0145 SCR C/V CYTO,THINLAYER,RESCR: HCPCS | Performed by: NURSE PRACTITIONER

## 2021-07-09 RX ORDER — LEVOTHYROXINE SODIUM 88 UG/1
88 TABLET ORAL DAILY
Qty: 90 TABLET | Refills: 3 | Status: SHIPPED | OUTPATIENT
Start: 2021-07-09 | End: 2021-07-09

## 2021-07-09 RX ORDER — LEVOTHYROXINE SODIUM 100 UG/1
88 TABLET ORAL DAILY
Qty: 30 TABLET | Refills: 1 | Status: SHIPPED | OUTPATIENT
Start: 2021-07-09 | End: 2021-09-29

## 2021-07-09 RX ORDER — LOSARTAN POTASSIUM AND HYDROCHLOROTHIAZIDE 12.5; 5 MG/1; MG/1
1 TABLET ORAL DAILY
Qty: 90 TABLET | Refills: 3 | Status: SHIPPED | OUTPATIENT
Start: 2021-07-09 | End: 2022-10-10

## 2021-07-09 ASSESSMENT — ENCOUNTER SYMPTOMS
MYALGIAS: 0
FEVER: 0
DIARRHEA: 0
HEADACHES: 0
DIZZINESS: 0
FREQUENCY: 0
CHILLS: 0
NAUSEA: 0
ABDOMINAL PAIN: 0
COUGH: 0
HEMATURIA: 0
PARESTHESIAS: 0
NERVOUS/ANXIOUS: 0
JOINT SWELLING: 0
PALPITATIONS: 0
CONSTIPATION: 0
HEMATOCHEZIA: 0
ARTHRALGIAS: 0
BREAST MASS: 0
SORE THROAT: 0
WEAKNESS: 0
EYE PAIN: 0
SHORTNESS OF BREATH: 0
HEARTBURN: 0
DYSURIA: 0

## 2021-07-09 ASSESSMENT — MIFFLIN-ST. JEOR: SCORE: 1725.8

## 2021-07-09 NOTE — LETTER
July 9, 2021      Seema Babin  [address] [address]        To Whom It May Concern,     Seema Babin needs to be able to wear supportive shoes, such as tennis shoes, while at work due to knee pain.      Sincerely,        ANA Camara CNP

## 2021-07-09 NOTE — PROGRESS NOTES
SUBJECTIVE:   CC: Seema Babin is an 55 year old woman who presents for preventive health visit.     Patient has been advised of split billing requirements and indicates understanding: Yes     Healthy Habits:     Getting at least 3 servings of Calcium per day:  Yes    Bi-annual eye exam:  NO    Dental care twice a year:  NO    Sleep apnea or symptoms of sleep apnea:  Excessive snoring and Sleep apnea    Diet:  Regular (no restrictions)    Frequency of exercise:  6-7 days/week    Duration of exercise:  45-60 minutes    Taking medications regularly:  Yes    Medication side effects:  None    PHQ-2 Total Score: 0    Additional concerns today:  No          Hypertension Follow-up      Do you check your blood pressure regularly outside of the clinic? No     Are you following a low salt diet? Yes    Are your blood pressures ever more than 140 on the top number (systolic) OR more   than 90 on the bottom number (diastolic), for example 140/90? No    Hypothyroidism Follow-up      Since last visit, patient describes the following symptoms: Weight stable, no hair loss, no skin changes, no constipation, no loose stools      Today's PHQ-2 Score:   PHQ-2 ( 1999 [employer]) 7/2/2021   Q1: Little interest or pleasure in doing things 0   Q2: Feeling down, depressed or hopeless 0   PHQ-2 Score 0   Q1: Little interest or pleasure in doing things Not at all   Q2: Feeling down, depressed or hopeless Not at all   PHQ-2 Score 0       Abuse: Current or Past (Physical, Sexual or Emotional) - No  Do you feel safe in your environment? Yes    Have you ever done Advance Care Planning? (For example, a Health Directive, POLST, or a discussion with a medical provider or your loved ones about your wishes): No, advance care planning information given to patient to review.  Advanced care planning was discussed at today's visit.    Social History     Tobacco Use     Smoking status: Never Smoker     Smokeless tobacco: Never Used   Substance Use  Topics     Alcohol use: Yes     Comment: wine now and then         Alcohol Use 7/2/2021   Prescreen: >3 drinks/day or >7 drinks/week? No   Prescreen: >3 drinks/day or >7 drinks/week? -       Reviewed orders with patient.  Reviewed health maintenance and updated orders accordingly - Yes  Labs reviewed in EPIC    Breast Cancer Screening:    Breast CA Risk Assessment (FHS-7) 7/2/2021   Do you have a family history of breast, colon, or ovarian cancer? No / Unknown         Mammogram Screening: Recommended annual mammography  Pertinent mammograms are reviewed under the imaging tab.    History of abnormal Pap smear: NO - age 30-65 PAP every 5 years with negative HPV co-testing recommended  PAP / HPV Latest Ref Rng & Units 5/2/2016   PAP - NIL   HPV 16 DNA NEG Negative   HPV 18 DNA NEG Negative   OTHER HR HPV NEG Negative     Reviewed and updated as needed this visit by clinical staff  Tobacco  Allergies  Meds              Reviewed and updated as needed this visit by Provider                    Review of Systems   Constitutional: Negative for chills and fever.   HENT: Negative for congestion, ear pain, hearing loss and sore throat.    Eyes: Negative for pain and visual disturbance.   Respiratory: Negative for cough and shortness of breath.    Cardiovascular: Negative for chest pain, palpitations and peripheral edema.   Gastrointestinal: Negative for abdominal pain, constipation, diarrhea, heartburn, hematochezia and nausea.   Breasts:  Negative for tenderness, breast mass and discharge.   Genitourinary: Negative for dysuria, frequency, genital sores, hematuria, pelvic pain, urgency, vaginal bleeding and vaginal discharge.   Musculoskeletal: Negative for arthralgias, joint swelling and myalgias.   Skin: Negative for rash.   Neurological: Negative for dizziness, weakness, headaches and paresthesias.   Psychiatric/Behavioral: Negative for mood changes. The patient is not nervous/anxious.           OBJECTIVE:   /82 (BP  "Location: Left arm, Patient Position: Sitting, Cuff Size: Adult Large)   Pulse 72   Temp 98.6  F (37  C) (Oral)   Ht 1.6 m (5' 3\")   Wt 115.7 kg (255 lb)   SpO2 96%   BMI 45.17 kg/m    Physical Exam  GENERAL: healthy, alert and no distress  EYES: Eyes grossly normal to inspection, PERRL and conjunctivae and sclerae normal  HENT: ear canals and TM's normal, nose and mouth without ulcers or lesions  NECK: no adenopathy, no asymmetry, masses, or scars and thyroid normal to palpation  RESP: lungs clear to auscultation - no rales, rhonchi or wheezes  BREAST: normal without masses, tenderness or nipple discharge and no palpable axillary masses or adenopathy  CV: regular rate and rhythm, normal S1 S2, no S3 or S4, no murmur, click or rub, no peripheral edema and peripheral pulses strong  ABDOMEN: soft, nontender, no hepatosplenomegaly, no masses and bowel sounds normal  MS: no gross musculoskeletal defects noted, no edema  SKIN: Multiple skin tags bilateral axillae and face- approx 15 lesions right axilla, 10 lesions left axilla, and 4 lesions of face treated with LNx1  NEURO: Normal strength and tone, mentation intact and speech normal  PSYCH: mentation appears normal, affect normal/bright    Diagnostic Test Results:  Labs reviewed in Epic  No results found for any visits on 07/09/21.    ASSESSMENT/PLAN:   1. Routine general medical examination at a health care facility    - Lipid panel reflex to direct LDL Fasting    2. Hypertension goal BP (blood pressure) < 140/90  Well controlled, continue current medications  - losartan-hydrochlorothiazide (HYZAAR) 50-12.5 MG tablet; Take 1 tablet by mouth daily  Dispense: 90 tablet; Refill: 3    3. Hypothyroidism, unspecified type  Had recent dose adjustment, repeat labs today and consider further dose titration  - TSH with free T4 reflex  - levothyroxine (SYNTHROID/LEVOTHROID) 88 MCG tablet; Take 1 tablet (88 mcg) by mouth daily  Dispense: 90 tablet; Refill: 3    4. Screening " "for malignant neoplasm of cervix    - Pap imaged thin layer screen with HPV - recommended age 30 - 65 years (select HPV order below)  - HPV High Risk Types DNA Cervical    5. Advanced directives, counseling/discussion      6. Skin tag    - DESTRUCT BENIGN LESION, 15 OR MORE    7. Screen for colon cancer  Return Cologuard test    8. BMI 45.0-49.9, adult (H)  Diet/exercise counseling      Patient has been advised of split billing requirements and indicates understanding: No  COUNSELING:  Reviewed preventive health counseling, as reflected in patient instructions       Regular exercise       Healthy diet/nutrition       Immunizations    Declined: Zoster due to Cost               Colon cancer screening       Advance Care Planning    Estimated body mass index is 45.17 kg/m  as calculated from the following:    Height as of this encounter: 1.6 m (5' 3\").    Weight as of this encounter: 115.7 kg (255 lb).    Weight management plan: Discussed healthy diet and exercise guidelines    She reports that she has never smoked. She has never used smokeless tobacco.      Counseling Resources:  ATP IV Guidelines  Pooled Cohorts Equation Calculator  Breast Cancer Risk Calculator  BRCA-Related Cancer Risk Assessment: FHS-7 Tool  FRAX Risk Assessment  ICSI Preventive Guidelines  Dietary Guidelines for Americans, 2010  USDA's MyPlate  ASA Prophylaxis  Lung CA Screening    ANA Camara [facility]  "

## 2021-07-14 LAB
COPATH REPORT: ABNORMAL
FINAL DIAGNOSIS: NORMAL
HPV HR 12 DNA CVX QL NAA+PROBE: NEGATIVE
HPV16 DNA SPEC QL NAA+PROBE: NEGATIVE
HPV18 DNA SPEC QL NAA+PROBE: NEGATIVE
PAP: ABNORMAL
SPECIMEN DESCRIPTION: NORMAL
SPECIMEN SOURCE CVX/VAG CYTO: NORMAL

## 2021-07-15 PROBLEM — R87.610 ASCUS OF CERVIX WITH NEGATIVE HIGH RISK HPV: Status: ACTIVE | Noted: 2021-07-15

## 2021-08-07 DIAGNOSIS — E03.9 HYPOTHYROIDISM, UNSPECIFIED TYPE: ICD-10-CM

## 2021-08-09 RX ORDER — LEVOTHYROXINE SODIUM 100 UG/1
88 TABLET ORAL DAILY
Qty: 30 TABLET | Refills: 1 | OUTPATIENT
Start: 2021-08-09

## 2021-09-04 ENCOUNTER — HEALTH MAINTENANCE LETTER (OUTPATIENT)
Age: 56
End: 2021-09-04

## 2021-10-29 DIAGNOSIS — E03.9 HYPOTHYROIDISM, UNSPECIFIED TYPE: ICD-10-CM

## 2021-10-29 NOTE — LETTER
November 3, 2021      Seema Babin  [address] [address]        Dear Seema,     Your provider has sent a 30 day clay refill of levothyroxine (SYNTHROID/LEVOTHROID) 100 MCG tablet. You are due for a lab only visit for further refills. Please contact the clinic to schedule an appointment for further refills.        Sincerely,        ANA Camara CNP

## 2021-11-02 RX ORDER — LEVOTHYROXINE SODIUM 100 UG/1
100 TABLET ORAL DAILY
Qty: 30 TABLET | Refills: 0 | Status: SHIPPED | OUTPATIENT
Start: 2021-11-02 | End: 2022-01-12

## 2021-11-02 NOTE — TELEPHONE ENCOUNTER
Medication is being filled for 1 time refill only due to:  Future labs ordered TSH (Thyroid).   Please notify patient she is over due for lab only visit, needs thyroid rechecked.     Maria Teresa Gibbs RN

## 2021-12-02 DIAGNOSIS — E03.9 HYPOTHYROIDISM, UNSPECIFIED TYPE: ICD-10-CM

## 2021-12-04 NOTE — TELEPHONE ENCOUNTER
Routing refill request to provider for review/approval because:  Frieda given x1 and patient did not follow up, please advise  Labs out of range:    TSH   Date Value Ref Range Status   07/09/2021 6.23 (H) 0.40 - 4.00 mU/L Final

## 2021-12-06 RX ORDER — LEVOTHYROXINE SODIUM 100 UG/1
100 TABLET ORAL DAILY
Qty: 30 TABLET | Refills: 0 | OUTPATIENT
Start: 2021-12-06

## 2021-12-07 NOTE — TELEPHONE ENCOUNTER
Message left for patient to return call. Patient needs lab visit prior to medication refills. Please help patient schedule. Shavon Blake MA  '

## 2022-01-03 ENCOUNTER — MYC REFILL (OUTPATIENT)
Dept: FAMILY MEDICINE | Facility: CLINIC | Age: 57
End: 2022-01-03
Payer: COMMERCIAL

## 2022-01-03 DIAGNOSIS — E03.9 HYPOTHYROIDISM, UNSPECIFIED TYPE: ICD-10-CM

## 2022-01-03 NOTE — LETTER
January 10, 2022      Seema Babin  [address] [address]        Dear Seema,     We have been trying to reach you. Your provider is unable to refill levothyroxine (SYNTHROID/LEVOTHROID) 100 MCG tablet. You are due for an appointment for further refills.  Please contact the clinic at [phone] to schedule an appointment.        Sincerely,        ANA Camara CNP

## 2022-01-05 RX ORDER — LEVOTHYROXINE SODIUM 100 UG/1
100 TABLET ORAL DAILY
Qty: 30 TABLET | Refills: 0 | OUTPATIENT
Start: 2022-01-05

## 2022-01-05 NOTE — TELEPHONE ENCOUNTER
"Routing refill request to provider for review/approval because:  Labs out of range:  TSH      Requested Prescriptions   Pending Prescriptions Disp Refills     levothyroxine (SYNTHROID/LEVOTHROID) 100 MCG tablet 30 tablet 0     Sig: Take 1 tablet (100 mcg) by mouth daily Schedule lab appt for further refills       Thyroid Protocol Failed - 1/4/2022  6:55 AM        Failed - Normal TSH on file in past 12 months     Recent Labs   Lab Test 07/09/21  0915   TSH 6.23*              Passed - Patient is 12 years or older        Passed - Recent (12 mo) or future (30 days) visit within the authorizing provider's specialty     Patient has had an office visit with the authorizing provider or a provider within the authorizing providers department within the previous 12 mos or has a future within next 30 days. See \"Patient Info\" tab in inbasket, or \"Choose Columns\" in Meds & Orders section of the refill encounter.              Passed - Medication is active on med list        Passed - No active pregnancy on record     If patient is pregnant or has had a positive pregnancy test, please check TSH.          Passed - No positive pregnancy test in past 12 months     If patient is pregnant or has had a positive pregnancy test, please check TSH.             Jenna MASON RN, BSN  [facility]    "

## 2022-01-06 NOTE — TELEPHONE ENCOUNTER
Left a message for Seema to call the clinic to schedule an appointment. Please help the patient schedule for Medication an appointment.  Sondra Aguilera-  Dee

## 2022-01-10 NOTE — TELEPHONE ENCOUNTER
Second attempt. Left voice message for pt to give us a callback, please assist with scheduling. Letter sent to address on file  Jenna PERDOMO-

## 2022-01-12 ENCOUNTER — VIRTUAL VISIT (OUTPATIENT)
Dept: FAMILY MEDICINE | Facility: CLINIC | Age: 57
End: 2022-01-12
Payer: COMMERCIAL

## 2022-01-12 DIAGNOSIS — U07.1 INFECTION DUE TO 2019 NOVEL CORONAVIRUS: ICD-10-CM

## 2022-01-12 DIAGNOSIS — E03.9 HYPOTHYROIDISM, UNSPECIFIED TYPE: Primary | ICD-10-CM

## 2022-01-12 DIAGNOSIS — E78.5 HYPERLIPIDEMIA LDL GOAL <100: ICD-10-CM

## 2022-01-12 PROCEDURE — 99213 OFFICE O/P EST LOW 20 MIN: CPT | Mod: 95 | Performed by: NURSE PRACTITIONER

## 2022-01-12 RX ORDER — LEVOTHYROXINE SODIUM 100 UG/1
100 TABLET ORAL DAILY
Qty: 30 TABLET | Refills: 0 | Status: SHIPPED | OUTPATIENT
Start: 2022-01-12 | End: 2022-02-06

## 2022-01-12 NOTE — PROGRESS NOTES
"Seema is a 56 year old who is being evaluated via a billable telephone visit.      What phone number would you like to be contacted at? [phone]   How would you like to obtain your AVS? MyChart    Assessment & Plan     Hypothyroidism, unspecified type  Hypothyroid on last labs- follow up for lab appt in the next 1-4 weeks  - TSH with free T4 reflex; Future  - levothyroxine (SYNTHROID/LEVOTHROID) 100 MCG tablet; Take 1 tablet (100 mcg) by mouth daily    Hyperlipidemia LDL goal <100  Diet/exercise counseling performed  - Lipid panel reflex to direct LDL Fasting; Future    Infection due to 2019 novel coronavirus  Recovering as expected, discussed booster shot recommendations      Ordering of each unique test  Prescription drug management         BMI:   Estimated body mass index is 45.17 kg/m  as calculated from the following:    Height as of 7/9/21: 1.6 m (5' 3\").    Weight as of 7/9/21: 115.7 kg (255 lb).       See Patient Instructions    Return in about 2 weeks (around 1/26/2022) for Lab Only Appointment.    ANA Camara [facility]    Eliel Stephenson is a 56 year old who presents for the following health issues  accompanied by her .    HPI     Hypothyroidism Follow-up      Since last visit, patient describes the following symptoms: Weight stable, no hair loss, no skin changes, no constipation, no loose stools      How many servings of fruits and vegetables do you eat daily?  2-3     On average, how many sweetened beverages do you drink each day (Examples: soda, juice, sweet tea, etc.  Do NOT count diet or artificially sweetened beverages)?   1-2    How many days per week do you exercise enough to make your heart beat faster? Walks     How many minutes a day do you exercise enough to make your heart beat faster? 60 or more    How many days per week do you miss taking your medication? 0      The 10-year ASCVD risk score ([place] YUDI Jr., et al., 2013) is: 3.6%    Values " used to calculate the score:      Age: 56 years      Sex: Female      Is Non- : No      Diabetic: No      Tobacco smoker: No      Systolic Blood Pressure: 126 mmHg      Is BP treated: Yes      HDL Cholesterol: 45 mg/dL      Total Cholesterol: 228 mg/dL    Has COVID-19, tested positive on Tuesday last week. Still has cold symptoms, fatigue.        Review of Systems   Constitutional, HEENT, cardiovascular, pulmonary, gi and gu systems are negative, except as otherwise noted.      Objective           Vitals:  No vitals were obtained today due to virtual visit.    Physical Exam   healthy, alert and no distress  PSYCH: Alert and oriented times 3; coherent speech, normal   rate and volume, able to articulate logical thoughts, able   to abstract reason, no tangential thoughts, no hallucinations   or delusions  Her affect is normal  RESP: No cough, no audible wheezing, able to talk in full sentences  Remainder of exam unable to be completed due to telephone visits    No results found for any visits on 01/12/22.            Phone call duration: 9 minutes

## 2022-02-04 DIAGNOSIS — E03.9 HYPOTHYROIDISM, UNSPECIFIED TYPE: ICD-10-CM

## 2022-02-06 RX ORDER — LEVOTHYROXINE SODIUM 100 UG/1
TABLET ORAL
Qty: 30 TABLET | Refills: 0 | Status: SHIPPED | OUTPATIENT
Start: 2022-02-06 | End: 2022-04-19

## 2022-02-06 NOTE — TELEPHONE ENCOUNTER
Medication is being filled for 1 time refill only due to:  Future labs ordered TSH.    Per 7/9/21 labs: TSH (thyroid stimulating hormone) is abnormal suggesting you are currently underreplaced.  ADVISE: changing your medication dose to 100 micrograms/day (a prescription has been sent to your pharmacy) and rechecking your TSH with a lab appointment in 6-8 weeks.    Patient overdue for labs appointment please call and assist with scheduling.     Maria Teresa Gibbs RN

## 2022-03-22 ENCOUNTER — LAB (OUTPATIENT)
Dept: LAB | Facility: CLINIC | Age: 57
End: 2022-03-22
Payer: COMMERCIAL

## 2022-03-22 DIAGNOSIS — E78.5 HYPERLIPIDEMIA LDL GOAL <100: ICD-10-CM

## 2022-03-22 DIAGNOSIS — E03.9 HYPOTHYROIDISM, UNSPECIFIED TYPE: ICD-10-CM

## 2022-03-22 DIAGNOSIS — I10 HYPERTENSION GOAL BP (BLOOD PRESSURE) < 140/90: Primary | ICD-10-CM

## 2022-03-22 LAB
ANION GAP SERPL CALCULATED.3IONS-SCNC: 6 MMOL/L (ref 3–14)
BUN SERPL-MCNC: 16 MG/DL (ref 7–30)
CALCIUM SERPL-MCNC: 9.4 MG/DL (ref 8.5–10.1)
CHLORIDE BLD-SCNC: 105 MMOL/L (ref 94–109)
CHOLEST SERPL-MCNC: 227 MG/DL
CO2 SERPL-SCNC: 27 MMOL/L (ref 20–32)
CREAT SERPL-MCNC: 0.85 MG/DL (ref 0.52–1.04)
FASTING STATUS PATIENT QL REPORTED: YES
GFR SERPL CREATININE-BSD FRML MDRD: 80 ML/MIN/1.73M2
GLUCOSE BLD-MCNC: 109 MG/DL (ref 70–99)
HDLC SERPL-MCNC: 47 MG/DL
LDLC SERPL CALC-MCNC: 151 MG/DL
NONHDLC SERPL-MCNC: 180 MG/DL
POTASSIUM BLD-SCNC: 3.8 MMOL/L (ref 3.4–5.3)
SODIUM SERPL-SCNC: 138 MMOL/L (ref 133–144)
T4 FREE SERPL-MCNC: 1.34 NG/DL (ref 0.76–1.46)
TRIGL SERPL-MCNC: 146 MG/DL
TSH SERPL DL<=0.005 MIU/L-ACNC: 5.06 MU/L (ref 0.4–4)

## 2022-03-22 PROCEDURE — 36415 COLL VENOUS BLD VENIPUNCTURE: CPT

## 2022-03-22 PROCEDURE — 80048 BASIC METABOLIC PNL TOTAL CA: CPT

## 2022-03-22 PROCEDURE — 84439 ASSAY OF FREE THYROXINE: CPT

## 2022-03-22 PROCEDURE — 84443 ASSAY THYROID STIM HORMONE: CPT

## 2022-03-22 PROCEDURE — 80061 LIPID PANEL: CPT

## 2022-03-22 NOTE — RESULT ENCOUNTER NOTE
Here is a copy of your labs. Becki will review them with you at your upcoming appointment.   Jasmyn Dueñas PA-C

## 2022-04-15 DIAGNOSIS — E03.9 HYPOTHYROIDISM, UNSPECIFIED TYPE: ICD-10-CM

## 2022-04-16 NOTE — TELEPHONE ENCOUNTER
"Routing refill request to provider for review/approval because:  Labs out of range:  TSH  Patient is scheduled for visit with PCP on 4/20/22      Requested Prescriptions   Pending Prescriptions Disp Refills     levothyroxine (SYNTHROID/LEVOTHROID) 100 MCG tablet [Pharmacy Med Name: LEVOTHYROXINE 100 MCG TABLET] 30 tablet 0     Sig: TAKE 1 TABLET BY MOUTH EVERY DAY       Thyroid Protocol Failed - 4/15/2022  1:38 PM        Failed - Normal TSH on file in past 12 months     Recent Labs   Lab Test 03/22/22  0758   TSH 5.06*              Passed - Patient is 12 years or older        Passed - Recent (12 mo) or future (30 days) visit within the authorizing provider's specialty     Patient has had an office visit with the authorizing provider or a provider within the authorizing providers department within the previous 12 mos or has a future within next 30 days. See \"Patient Info\" tab in inbasket, or \"Choose Columns\" in Meds & Orders section of the refill encounter.              Passed - Medication is active on med list        Passed - No active pregnancy on record     If patient is pregnant or has had a positive pregnancy test, please check TSH.          Passed - No positive pregnancy test in past 12 months     If patient is pregnant or has had a positive pregnancy test, please check TSH.             Maria Teresa Gibbs RN    "

## 2022-04-19 RX ORDER — LEVOTHYROXINE SODIUM 100 UG/1
TABLET ORAL
Qty: 30 TABLET | Refills: 0 | Status: SHIPPED | OUTPATIENT
Start: 2022-04-19

## 2022-04-20 ENCOUNTER — OFFICE VISIT (OUTPATIENT)
Dept: FAMILY MEDICINE | Facility: CLINIC | Age: 57
End: 2022-04-20
Payer: COMMERCIAL

## 2022-04-20 VITALS
HEIGHT: 63 IN | HEART RATE: 94 BPM | BODY MASS INDEX: 47.31 KG/M2 | WEIGHT: 267 LBS | DIASTOLIC BLOOD PRESSURE: 84 MMHG | OXYGEN SATURATION: 95 % | SYSTOLIC BLOOD PRESSURE: 124 MMHG | TEMPERATURE: 98.4 F

## 2022-04-20 DIAGNOSIS — Z23 HIGH PRIORITY FOR 2019-NCOV VACCINE: ICD-10-CM

## 2022-04-20 DIAGNOSIS — Z12.11 SCREEN FOR COLON CANCER: ICD-10-CM

## 2022-04-20 DIAGNOSIS — E03.9 HYPOTHYROIDISM, UNSPECIFIED TYPE: Primary | ICD-10-CM

## 2022-04-20 PROCEDURE — 0054A COVID-19,PF,PFIZER (12+ YRS): CPT | Performed by: NURSE PRACTITIONER

## 2022-04-20 PROCEDURE — 91305 COVID-19,PF,PFIZER (12+ YRS): CPT | Performed by: NURSE PRACTITIONER

## 2022-04-20 PROCEDURE — 99214 OFFICE O/P EST MOD 30 MIN: CPT | Performed by: NURSE PRACTITIONER

## 2022-04-20 NOTE — PATIENT INSTRUCTIONS
your Levothyroxine at the pharmacy.Take this every day for 4 weeks, then come back for a blood test.  You will need a blood test every 4-6 weeks until the dose is right.

## 2022-04-20 NOTE — PROGRESS NOTES
"  Assessment & Plan     Hypothyroidism, unspecified type  Uncontrolled. Patient is very confused about need for labs and does not want to come into the clinic. Detailed counseling given about the risks of untreated hypothyroidism, importance of lab follow up to properly titrate dose, and importance of taking medication exactly as prescribed. Will not adjust her dose today as she was likely out of medication for a couple weeks prior to last lab draw. Restart levothyroxine at current dose and follow up in 4 weeks for lab only appt. Patient understands and agrees.  - TSH with free T4 reflex; Future    BMI 45.0-49.9, adult (H)  Diet/exercise counseling performed    Screen for colon cancer    - Adult Gastro Ref - Procedure Only; Future    High priority for 2019-nCoV vaccine    - COVID-19,PF,PFIZER (12+ Yrs GRAY LABEL)    Ordering of each unique test  Prescription drug management         BMI:   Estimated body mass index is 47.3 kg/m  as calculated from the following:    Height as of this encounter: 1.6 m (5' 3\").    Weight as of this encounter: 121.1 kg (267 lb).   Weight management plan: Discussed healthy diet and exercise guidelines    See Patient Instructions    Return in about 1 month (around 5/20/2022) for Lab Only Appointment.    Becki Dunlap, ANA CNP  [facility]   Seema is a 56 year old who presents for the following health issues  accompanied by her self.    History of Present Illness       Hypertension: She presents for follow up of hypertension.  She does not check blood pressure  regularly outside of the clinic. Outpatient blood pressures have not been over 140/90. : kind of, tries to.     Hypothyroidism:     Since last visit, patient describes the following symptoms::  Hair loss    Reason for visit:  Lab follow up    She eats 2-3 servings of fruits and vegetables daily.She consumes 1 sweetened beverage(s) daily.She exercises with enough effort to increase her " "heart rate 30 to 60 minutes per day.  She exercises with enough effort to increase her heart rate 6 days per week. She is missing 1 dose(s) of medications per week.     The 10-year ASCVD risk score ([place] BAGLEY JrLeida, et al., 2013) is: 3.3%    Values used to calculate the score:      Age: 56 years      Sex: Female      Is Non- : No      Diabetic: No      Tobacco smoker: No      Systolic Blood Pressure: 124 mmHg      Is BP treated: Yes      HDL Cholesterol: 47 mg/dL      Total Cholesterol: 227 mg/dL    Patient presents for follow up of Hypothyroidism. Initially states she is taking Levothyroxine, but upon further history, she has actually been out of this. Per pharmacy fill data, last filled on 2/6/22 for 30 day supply and labs were done on 3/22/22.      Review of Systems   Constitutional, HEENT, cardiovascular, pulmonary, gi and gu systems are negative, except as otherwise noted.      Objective    /84 (BP Location: Right arm, Patient Position: Sitting, Cuff Size: Adult Large)   Pulse 94   Temp 98.4  F (36.9  C) (Oral)   Ht 1.6 m (5' 3\")   Wt 121.1 kg (267 lb)   SpO2 95%   BMI 47.30 kg/m    Body mass index is 47.3 kg/m .  Physical Exam   GENERAL: healthy, alert and no distress  NECK: no adenopathy, no asymmetry, masses, or scars and thyroid normal to palpation  RESP: lungs clear to auscultation - no rales, rhonchi or wheezes  CV: regular rate and rhythm, normal S1 S2, no S3 or S4, no murmur, click or rub, no peripheral edema and peripheral pulses strong    No results found for any visits on 04/20/22.            "

## 2022-05-18 DIAGNOSIS — E03.9 HYPOTHYROIDISM, UNSPECIFIED TYPE: ICD-10-CM

## 2022-05-19 NOTE — TELEPHONE ENCOUNTER
"L.H.    Patient has her f/u lab on the 20th for TSH recheck. Unsure if you wanted to wait for results before refill?    Requested Prescriptions   Pending Prescriptions Disp Refills     levothyroxine (SYNTHROID/LEVOTHROID) 100 MCG tablet [Pharmacy Med Name: LEVOTHYROXINE 100 MCG TABLET] 30 tablet 0     Sig: TAKE 1 TABLET BY MOUTH EVERY DAY       Thyroid Protocol Failed - 5/18/2022 12:33 PM        Failed - Normal TSH on file in past 12 months     Recent Labs   Lab Test 03/22/22  0758   TSH 5.06*              Passed - Patient is 12 years or older        Passed - Recent (12 mo) or future (30 days) visit within the authorizing provider's specialty     Patient has had an office visit with the authorizing provider or a provider within the authorizing providers department within the previous 12 mos or has a future within next 30 days. See \"Patient Info\" tab in inbasket, or \"Choose Columns\" in Meds & Orders section of the refill encounter.              Passed - Medication is active on med list        Passed - No active pregnancy on record     If patient is pregnant or has had a positive pregnancy test, please check TSH.          Passed - No positive pregnancy test in past 12 months     If patient is pregnant or has had a positive pregnancy test, please check TSH.             Thanks,  ERI Rodriguez  [facility]     "

## 2022-05-20 RX ORDER — LEVOTHYROXINE SODIUM 100 UG/1
TABLET ORAL
Qty: 30 TABLET | Refills: 0 | OUTPATIENT
Start: 2022-05-20

## 2022-10-16 ENCOUNTER — HEALTH MAINTENANCE LETTER (OUTPATIENT)
Age: 57
End: 2022-10-16

## 2023-01-17 DIAGNOSIS — I10 HYPERTENSION GOAL BP (BLOOD PRESSURE) < 140/90: ICD-10-CM

## 2023-01-17 RX ORDER — LOSARTAN POTASSIUM AND HYDROCHLOROTHIAZIDE 12.5; 5 MG/1; MG/1
TABLET ORAL
Qty: 90 TABLET | Refills: 0 | Status: SHIPPED | OUTPATIENT
Start: 2023-01-17

## 2023-11-04 ENCOUNTER — HEALTH MAINTENANCE LETTER (OUTPATIENT)
Age: 58
End: 2023-11-04

## 2024-12-22 ENCOUNTER — HEALTH MAINTENANCE LETTER (OUTPATIENT)
Age: 59
End: 2024-12-22